# Patient Record
Sex: MALE | Race: WHITE | NOT HISPANIC OR LATINO
[De-identification: names, ages, dates, MRNs, and addresses within clinical notes are randomized per-mention and may not be internally consistent; named-entity substitution may affect disease eponyms.]

---

## 2017-01-16 ENCOUNTER — FORM ENCOUNTER (OUTPATIENT)
Age: 67
End: 2017-01-16

## 2017-01-17 ENCOUNTER — APPOINTMENT (OUTPATIENT)
Dept: CT IMAGING | Facility: IMAGING CENTER | Age: 67
End: 2017-01-17

## 2017-01-17 ENCOUNTER — OUTPATIENT (OUTPATIENT)
Dept: OUTPATIENT SERVICES | Facility: HOSPITAL | Age: 67
LOS: 1 days | End: 2017-01-17
Payer: MEDICARE

## 2017-01-17 DIAGNOSIS — J32.2 CHRONIC ETHMOIDAL SINUSITIS: ICD-10-CM

## 2017-01-17 DIAGNOSIS — J33.9 NASAL POLYP, UNSPECIFIED: ICD-10-CM

## 2017-01-17 PROCEDURE — 70486 CT MAXILLOFACIAL W/O DYE: CPT

## 2017-03-08 ENCOUNTER — FORM ENCOUNTER (OUTPATIENT)
Age: 67
End: 2017-03-08

## 2017-03-09 ENCOUNTER — OUTPATIENT (OUTPATIENT)
Dept: OUTPATIENT SERVICES | Facility: HOSPITAL | Age: 67
LOS: 1 days | End: 2017-03-09
Payer: MEDICARE

## 2017-03-09 VITALS
OXYGEN SATURATION: 97 % | SYSTOLIC BLOOD PRESSURE: 134 MMHG | DIASTOLIC BLOOD PRESSURE: 70 MMHG | RESPIRATION RATE: 16 BRPM | TEMPERATURE: 98 F | HEART RATE: 60 BPM | HEIGHT: 73 IN | WEIGHT: 220.02 LBS

## 2017-03-09 DIAGNOSIS — Z98.890 OTHER SPECIFIED POSTPROCEDURAL STATES: Chronic | ICD-10-CM

## 2017-03-09 DIAGNOSIS — G47.33 OBSTRUCTIVE SLEEP APNEA (ADULT) (PEDIATRIC): ICD-10-CM

## 2017-03-09 DIAGNOSIS — T78.40XS ALLERGY, UNSPECIFIED, SEQUELA: ICD-10-CM

## 2017-03-09 DIAGNOSIS — J32.2 CHRONIC ETHMOIDAL SINUSITIS: ICD-10-CM

## 2017-03-09 DIAGNOSIS — E11.9 TYPE 2 DIABETES MELLITUS WITHOUT COMPLICATIONS: ICD-10-CM

## 2017-03-09 DIAGNOSIS — J33.9 NASAL POLYP, UNSPECIFIED: ICD-10-CM

## 2017-03-09 DIAGNOSIS — Z87.09 PERSONAL HISTORY OF OTHER DISEASES OF THE RESPIRATORY SYSTEM: ICD-10-CM

## 2017-03-09 LAB
BASOPHILS # BLD AUTO: 0.04 K/UL — SIGNIFICANT CHANGE UP (ref 0–0.2)
BASOPHILS NFR BLD AUTO: 0.6 % — SIGNIFICANT CHANGE UP (ref 0–2)
BUN SERPL-MCNC: 21 MG/DL — SIGNIFICANT CHANGE UP (ref 7–23)
CALCIUM SERPL-MCNC: 9.6 MG/DL — SIGNIFICANT CHANGE UP (ref 8.4–10.5)
CHLORIDE SERPL-SCNC: 102 MMOL/L — SIGNIFICANT CHANGE UP (ref 98–107)
CO2 SERPL-SCNC: 27 MMOL/L — SIGNIFICANT CHANGE UP (ref 22–31)
CREAT SERPL-MCNC: 1.03 MG/DL — SIGNIFICANT CHANGE UP (ref 0.5–1.3)
EOSINOPHIL # BLD AUTO: 0.53 K/UL — HIGH (ref 0–0.5)
EOSINOPHIL NFR BLD AUTO: 8.5 % — HIGH (ref 0–6)
GLUCOSE SERPL-MCNC: 140 MG/DL — HIGH (ref 70–99)
HBA1C BLD-MCNC: 7.4 % — HIGH (ref 4–5.6)
HCT VFR BLD CALC: 44.8 % — SIGNIFICANT CHANGE UP (ref 39–50)
HGB BLD-MCNC: 15.1 G/DL — SIGNIFICANT CHANGE UP (ref 13–17)
IMM GRANULOCYTES NFR BLD AUTO: 0 % — SIGNIFICANT CHANGE UP (ref 0–1.5)
LYMPHOCYTES # BLD AUTO: 1.96 K/UL — SIGNIFICANT CHANGE UP (ref 1–3.3)
LYMPHOCYTES # BLD AUTO: 31.3 % — SIGNIFICANT CHANGE UP (ref 13–44)
MCHC RBC-ENTMCNC: 31.3 PG — SIGNIFICANT CHANGE UP (ref 27–34)
MCHC RBC-ENTMCNC: 33.7 % — SIGNIFICANT CHANGE UP (ref 32–36)
MCV RBC AUTO: 92.8 FL — SIGNIFICANT CHANGE UP (ref 80–100)
MONOCYTES # BLD AUTO: 0.57 K/UL — SIGNIFICANT CHANGE UP (ref 0–0.9)
MONOCYTES NFR BLD AUTO: 9.1 % — SIGNIFICANT CHANGE UP (ref 2–14)
NEUTROPHILS # BLD AUTO: 3.17 K/UL — SIGNIFICANT CHANGE UP (ref 1.8–7.4)
NEUTROPHILS NFR BLD AUTO: 50.5 % — SIGNIFICANT CHANGE UP (ref 43–77)
PLATELET # BLD AUTO: 217 K/UL — SIGNIFICANT CHANGE UP (ref 150–400)
PMV BLD: 11 FL — SIGNIFICANT CHANGE UP (ref 7–13)
POTASSIUM SERPL-MCNC: 4 MMOL/L — SIGNIFICANT CHANGE UP (ref 3.5–5.3)
POTASSIUM SERPL-SCNC: 4 MMOL/L — SIGNIFICANT CHANGE UP (ref 3.5–5.3)
RBC # BLD: 4.83 M/UL — SIGNIFICANT CHANGE UP (ref 4.2–5.8)
RBC # FLD: 12.6 % — SIGNIFICANT CHANGE UP (ref 10.3–14.5)
SODIUM SERPL-SCNC: 143 MMOL/L — SIGNIFICANT CHANGE UP (ref 135–145)
WBC # BLD: 6.27 K/UL — SIGNIFICANT CHANGE UP (ref 3.8–10.5)
WBC # FLD AUTO: 6.27 K/UL — SIGNIFICANT CHANGE UP (ref 3.8–10.5)

## 2017-03-09 PROCEDURE — 93010 ELECTROCARDIOGRAM REPORT: CPT

## 2017-03-09 PROCEDURE — 71020: CPT | Mod: 26

## 2017-03-09 RX ORDER — ALBUTEROL 90 UG/1
2 AEROSOL, METERED ORAL
Qty: 0 | Refills: 0 | COMMUNITY

## 2017-03-09 RX ORDER — METFORMIN HYDROCHLORIDE 850 MG/1
1 TABLET ORAL
Qty: 0 | Refills: 0 | COMMUNITY

## 2017-03-09 RX ORDER — OMEGA-3 ACID ETHYL ESTERS 1 G
0 CAPSULE ORAL
Qty: 0 | Refills: 0 | COMMUNITY

## 2017-03-09 RX ORDER — MONTELUKAST 4 MG/1
1 TABLET, CHEWABLE ORAL
Qty: 0 | Refills: 0 | COMMUNITY

## 2017-03-09 NOTE — H&P PST ADULT - LYMPHATIC
posterior cervical L/anterior cervical L/posterior cervical R/anterior cervical R/supraclavicular L/supraclavicular R

## 2017-03-09 NOTE — H&P PST ADULT - RS GEN PE MLT RESP DETAILS PC
no rhonchi/good air movement/respirations non-labored/clear to auscultation bilaterally/no rales/breath sounds equal/no wheezes wheezes/respirations non-labored/no rales/rhonchi

## 2017-03-09 NOTE — H&P PST ADULT - PROBLEM SELECTOR PLAN 2
type 2 , bloods pending from pst , including [id number]. Pt is npo from 11 pm the night before surgery . pt to hold metformin and glipizide day of surgery . Recommend STAT FS upon admit and monitoring FS during hospital stay .

## 2017-03-09 NOTE — H&P PST ADULT - PROBLEM SELECTOR PLAN 3
O2 sat 97% on room air, lungs with expiratory wheezing , rhonchi to right base 1/3 up. Urgent CXR done . Call placed to Dr Ramírez pt to be evaluated today. Medical evaluation requested and to be faxed.

## 2017-03-09 NOTE — H&P PST ADULT - NSANTHOSAYNRD_GEN_A_CORE
No. DARLING screening performed.  STOP BANG Legend: 0-2 = LOW Risk; 3-4 = INTERMEDIATE Risk; 5-8 = HIGH Risk

## 2017-03-09 NOTE — H&P PST ADULT - VISION (WITH CORRECTIVE LENSES IF THE PATIENT USUALLY WEARS THEM):
Normal vision: sees adequately in most situations; can see medication labels, newsprint/Corrective glasses

## 2017-03-09 NOTE — H&P PST ADULT - HISTORY OF PRESENT ILLNESS
This is a 66 y.o. male s/p sinus endoscopy 2011. Pt recently evaluated by Dr Ruvalcaba approximately 6 weeks ago . Pt examined , follow-up Ct of sinuses . Pt complains of nasal congestion. Pt has chronic ethmoidal sinusitis , nasal polyp, unspecified . Pt now for surgery .

## 2017-03-09 NOTE — H&P PST ADULT - MUSCULOSKELETAL
details… detailed exam no joint swelling/no joint warmth/no joint erythema/no calf tenderness/ROM intact

## 2017-03-10 RX ORDER — BUDESONIDE, MICRONIZED 100 %
0 POWDER (GRAM) MISCELLANEOUS
Qty: 0 | Refills: 0 | COMMUNITY

## 2017-03-10 RX ORDER — BUDESONIDE, MICRONIZED 100 %
2 POWDER (GRAM) MISCELLANEOUS
Qty: 0 | Refills: 0 | COMMUNITY

## 2017-03-10 RX ORDER — BUDESONIDE 32 UG/1
0 SPRAY, METERED NASAL
Qty: 0 | Refills: 0 | COMMUNITY

## 2017-03-15 ENCOUNTER — RESULT REVIEW (OUTPATIENT)
Age: 67
End: 2017-03-15

## 2017-03-16 ENCOUNTER — OUTPATIENT (OUTPATIENT)
Dept: OUTPATIENT SERVICES | Facility: HOSPITAL | Age: 67
LOS: 1 days | Discharge: HOME | End: 2017-03-16
Payer: MEDICARE

## 2017-03-16 ENCOUNTER — APPOINTMENT (OUTPATIENT)
Dept: OTOLARYNGOLOGY | Facility: AMBULATORY SURGERY CENTER | Age: 67
End: 2017-03-16

## 2017-03-16 VITALS
SYSTOLIC BLOOD PRESSURE: 116 MMHG | HEIGHT: 73 IN | TEMPERATURE: 97 F | RESPIRATION RATE: 18 BRPM | OXYGEN SATURATION: 98 % | WEIGHT: 220.02 LBS | DIASTOLIC BLOOD PRESSURE: 72 MMHG | HEART RATE: 66 BPM

## 2017-03-16 VITALS
RESPIRATION RATE: 15 BRPM | DIASTOLIC BLOOD PRESSURE: 67 MMHG | OXYGEN SATURATION: 96 % | HEART RATE: 64 BPM | TEMPERATURE: 98 F | SYSTOLIC BLOOD PRESSURE: 115 MMHG

## 2017-03-16 DIAGNOSIS — J33.9 NASAL POLYP, UNSPECIFIED: ICD-10-CM

## 2017-03-16 DIAGNOSIS — Z98.890 OTHER SPECIFIED POSTPROCEDURAL STATES: Chronic | ICD-10-CM

## 2017-03-16 PROCEDURE — 61782 SCAN PROC CRANIAL EXTRA: CPT

## 2017-03-16 PROCEDURE — 31287 NASAL/SINUS ENDOSCOPY SURG: CPT | Mod: 50

## 2017-03-16 PROCEDURE — 31267 ENDOSCOPY MAXILLARY SINUS: CPT | Mod: 50

## 2017-03-16 PROCEDURE — 31276 NSL/SINS NDSC FRNT TISS RMVL: CPT | Mod: LT

## 2017-03-16 PROCEDURE — 88304 TISSUE EXAM BY PATHOLOGIST: CPT | Mod: 26

## 2017-03-16 PROCEDURE — 88305 TISSUE EXAM BY PATHOLOGIST: CPT | Mod: 26

## 2017-03-16 PROCEDURE — 88311 DECALCIFY TISSUE: CPT | Mod: 26

## 2017-03-16 PROCEDURE — 31237 NSL/SINS NDSC SURG BX POLYPC: CPT | Mod: 50,59

## 2017-03-16 PROCEDURE — 31255 NSL/SINS NDSC W/TOT ETHMDCT: CPT | Mod: 50

## 2017-03-16 RX ORDER — OXYCODONE HYDROCHLORIDE 5 MG/1
1 TABLET ORAL
Qty: 50 | Refills: 0 | OUTPATIENT
Start: 2017-03-16

## 2017-03-16 RX ORDER — CEPHALEXIN 500 MG
1 CAPSULE ORAL
Qty: 20 | Refills: 0 | OUTPATIENT
Start: 2017-03-16 | End: 2017-03-26

## 2017-03-16 NOTE — ASU DISCHARGE PLAN (ADULT/PEDIATRIC). - INSTRUCTIONS
SEE Preprinted Instructions from MD Soft cool foods and fluids..nothing hot in  temperatire or spicy--see sheet.

## 2017-03-16 NOTE — ASU DISCHARGE PLAN (ADULT/PEDIATRIC). - DIET
progress slowly,avoid any foods that are spicy, greasy or fatty, increase fluids and resume regular diet in am progress slowly,avoid any foods that are spicy, greasy or fatty, increase fluids and resume regular diet in am/increase fluids

## 2017-03-16 NOTE — ASU DISCHARGE PLAN (ADULT/PEDIATRIC). - NOTIFY
Inability to Tolerate Liquids or Foods/Persistent Nausea and Vomiting/Bleeding that does not stop/Fever greater than 101/Pain not relieved by Medications Persistent Nausea and Vomiting/Inability to Tolerate Liquids or Foods/Fever greater than 101/Pain not relieved by Medications/Bleeding that does not stop/Unable to Urinate

## 2017-03-16 NOTE — ASU DISCHARGE PLAN (ADULT/PEDIATRIC). - SPECIAL INSTRUCTIONS
Please read enclosed teaching sheet. Keep head of bed elevated. Start saline rinse once packing removed tomorrow

## 2017-03-16 NOTE — ASU DISCHARGE PLAN (ADULT/PEDIATRIC). - ACTIVITY LEVEL
no sports/gym/no heavy lifting/no exercise no sports/gym/no heavy lifting/No heavy lifting >10 pounds for 10 days/no exercise

## 2017-03-17 ENCOUNTER — APPOINTMENT (OUTPATIENT)
Dept: OTOLARYNGOLOGY | Facility: CLINIC | Age: 67
End: 2017-03-17

## 2017-03-17 ENCOUNTER — TRANSCRIPTION ENCOUNTER (OUTPATIENT)
Age: 67
End: 2017-03-17

## 2017-03-24 LAB — SURGICAL PATHOLOGY STUDY: SIGNIFICANT CHANGE UP

## 2017-04-14 ENCOUNTER — APPOINTMENT (OUTPATIENT)
Dept: OTOLARYNGOLOGY | Facility: CLINIC | Age: 67
End: 2017-04-14

## 2017-04-14 VITALS
SYSTOLIC BLOOD PRESSURE: 128 MMHG | BODY MASS INDEX: 27.59 KG/M2 | HEIGHT: 74 IN | WEIGHT: 215 LBS | DIASTOLIC BLOOD PRESSURE: 84 MMHG

## 2019-05-03 ENCOUNTER — APPOINTMENT (OUTPATIENT)
Dept: OTOLARYNGOLOGY | Facility: CLINIC | Age: 69
End: 2019-05-03
Payer: MEDICARE

## 2019-05-03 VITALS
HEIGHT: 74 IN | BODY MASS INDEX: 27.59 KG/M2 | DIASTOLIC BLOOD PRESSURE: 79 MMHG | WEIGHT: 215 LBS | SYSTOLIC BLOOD PRESSURE: 127 MMHG | HEART RATE: 63 BPM

## 2019-05-03 PROCEDURE — 99214 OFFICE O/P EST MOD 30 MIN: CPT | Mod: 25

## 2019-05-03 PROCEDURE — 92557 COMPREHENSIVE HEARING TEST: CPT

## 2019-05-03 PROCEDURE — 31231 NASAL ENDOSCOPY DX: CPT

## 2019-05-03 PROCEDURE — 92567 TYMPANOMETRY: CPT

## 2019-05-03 NOTE — REASON FOR VISIT
[Subsequent Evaluation] : a subsequent evaluation for [Spouse] : spouse [FreeTextEntry2] : follow up s/p Bilateral intraoperative CTguided endoscopic nasal polypectomy; ethmoidectomy; sphenoidectomy; maxillary antrostomy with sinus curettage; left frontal sinus exploration 3/16/17

## 2019-05-03 NOTE — PROCEDURE
[Image(s) Captured] : image(s) captured and filed [Oxymetazoline HCl] : oxymetazoline HCl [Topical Lidocaine] : topical lidocaine [Serial Number: ___] : Serial Number: [unfilled] [Flexible Endoscope] : examined with the flexible endoscope [Recalcitrant Symptoms] : recalcitrant symptoms  [Anatomical Abnormality] : anatomical abnormality [Anterior rhinoscopy insufficient to account for symptoms] : anterior rhinoscopy insufficient to account for symptoms [Normal] : the middle meatus had no abnormalities [FreeTextEntry6] : Pre-op indication(s): anosmia\par Post-op indication(s): polyps\par Verbal consent obtained from patient.\par “Anterior rhinoscopy insufficient to account for symptoms” \par Details for procedure: \par Scope #: [id number]\par Type of scope:    flexible fiber optic telescope   X  Rigid glass telescope \par Anesthesia and/or vasoconstriction was achieved topically by using: \par 4% Lidocaine spray   0.05% Oxymetazoline     Other ______ \par The following anatomic sites were directly examined in a sequential fashion: \par The scope was introduced in the nasal passage between the middle and inferior turbinates to exam the inferior portion of the middle meatus and the fontanelle, as well as the maxillary ostia. Next, the scope was passed medially and posteriorly to the middle turbinates to examine the sphenoethmoid recess and the superior turbinate region. \par Upon visualization the finders are as follows: \par Nasal Septum:   Normal   \par Bleeding site cauterized:    Anterior   left   right   Posterior   left   right \par Method:   Silver Nitrate   YAG Laser    Electrocautery ______ \par Right Side: \par * Mucosa: Normal\par * Mucous: Normal\par * Polyp: forming in superior cavity\par * Inferior Turbinate: Normal\par * Middle Turbinate: Normal\par * Superior Turbinate: Normal\par * Inferior Meatus: Normal\par * Middle Meatus: Normal\par * Super Meatus: Normal\par * Sphenoethmoidal Recess: Normal\par Left Side: \par * Mucosa: Normal\par * Mucous: Normal\par * Polyp: Normal\par * Inferior Turbinate: Normal\par * Middle Turbinate: Normal\par * Superior Turbinate: Normal\par * Inferior Meatus: Normal\par * Middle Meatus: Normal\par * Super Meatus: Normal\par * Sphenoethmoidal Recess: Normal\par The patient tolerated the procedure well without any complications.\par \par \par  [de-identified] : anosmia

## 2019-05-03 NOTE — PHYSICAL EXAM
[Nasal Endoscopy Performed] : nasal endoscopy was performed, see procedure section for findings [Normal] : mucosa is normal [Midline] : trachea located in midline position [de-identified] : polyps forming in superior right cavity

## 2019-05-03 NOTE — HISTORY OF PRESENT ILLNESS
[None] : The patient is currently asymptomatic. [de-identified] : 68 year old male follow up s/p Bilateral intraoperative CTguided endoscopic nasal polypectomy; ethmoidectomy; sphenoidectomy; maxillary antrostomy with sinus curettage; left frontal sinus exploration 3/16/17.  States some nasal congestion.  States breathing well through nose.   States still no sense of smell, no change for years.  States  sinus pain/pressure, post nasal drip, nasal discharge.  Denies sinus infections in the past year.  \par  [de-identified] : anosmia

## 2019-05-21 ENCOUNTER — TRANSCRIPTION ENCOUNTER (OUTPATIENT)
Age: 69
End: 2019-05-21

## 2020-03-27 RX ORDER — BUDESONIDE 32 UG/1
32 SPRAY, METERED NASAL DAILY
Qty: 1 | Refills: 5 | Status: ACTIVE | COMMUNITY
Start: 2019-05-03 | End: 1900-01-01

## 2021-03-17 ENCOUNTER — APPOINTMENT (OUTPATIENT)
Dept: OTOLARYNGOLOGY | Facility: CLINIC | Age: 71
End: 2021-03-17
Payer: MEDICARE

## 2021-03-17 VITALS
BODY MASS INDEX: 25.67 KG/M2 | WEIGHT: 200 LBS | DIASTOLIC BLOOD PRESSURE: 74 MMHG | SYSTOLIC BLOOD PRESSURE: 121 MMHG | HEART RATE: 73 BPM | HEIGHT: 74 IN

## 2021-03-17 DIAGNOSIS — H91.93 UNSPECIFIED HEARING LOSS, BILATERAL: ICD-10-CM

## 2021-03-17 DIAGNOSIS — Z80.0 FAMILY HISTORY OF MALIGNANT NEOPLASM OF DIGESTIVE ORGANS: ICD-10-CM

## 2021-03-17 DIAGNOSIS — Z86.39 PERSONAL HISTORY OF OTHER ENDOCRINE, NUTRITIONAL AND METABOLIC DISEASE: ICD-10-CM

## 2021-03-17 DIAGNOSIS — K57.30 DIVERTICULOSIS OF LARGE INTESTINE W/OUT PERFORATION OR ABSCESS W/OUT BLEEDING: ICD-10-CM

## 2021-03-17 DIAGNOSIS — Z87.09 PERSONAL HISTORY OF OTHER DISEASES OF THE RESPIRATORY SYSTEM: ICD-10-CM

## 2021-03-17 DIAGNOSIS — Z83.3 FAMILY HISTORY OF DIABETES MELLITUS: ICD-10-CM

## 2021-03-17 PROCEDURE — 31231 NASAL ENDOSCOPY DX: CPT

## 2021-03-17 PROCEDURE — 99214 OFFICE O/P EST MOD 30 MIN: CPT | Mod: 25

## 2021-03-17 RX ORDER — ALBUTEROL SULFATE 90 UG/1
INHALANT RESPIRATORY (INHALATION)
Refills: 0 | Status: ACTIVE | COMMUNITY

## 2021-03-17 RX ORDER — PREDNISONE 20 MG/1
20 TABLET ORAL DAILY
Qty: 30 | Refills: 1 | Status: DISCONTINUED | COMMUNITY
Start: 2019-05-03 | End: 2021-03-17

## 2021-03-17 NOTE — REASON FOR VISIT
[Subsequent Evaluation] : a subsequent evaluation for [Spouse] : spouse [FreeTextEntry2] : follow up for chronic anosmia

## 2021-03-17 NOTE — PHYSICAL EXAM
[Nasal Endoscopy Performed] : nasal endoscopy was performed, see procedure section for findings [Midline] : trachea located in midline position [Normal] : no abnormal secretions [FreeTextEntry1] : anosmia

## 2021-03-17 NOTE — PROCEDURE
[Image(s) Captured] : image(s) captured and filed [Video Captured] : video captured and filed [Topical Lidocaine] : topical lidocaine [Oxymetazoline HCl] : oxymetazoline HCl [Flexible Endoscope] : examined with the flexible endoscope [Serial Number: ___] : Serial Number: [unfilled] [Recalcitrant Symptoms] : recalcitrant symptoms  [Anterior rhinoscopy insufficient to account for symptoms] : anterior rhinoscopy insufficient to account for symptoms [Allergic] : allergic signs [Normal] : the middle meatus had no abnormalities [Paranasal Sinuses Middle Meatus] : no polyps [FreeTextEntry6] : Pre-op indication(s): anosmia\par Post-op indication(s):airway patent \par Verbal consent obtained from patient.\par “Anterior rhinoscopy insufficient to account for symptoms” \par Details for procedure: \par Scope #: 197\par Type of scope:    flexible fiber optic telescope  X   Rigid glass telescope \par Anesthesia and/or vasoconstriction was achieved topically by using: \par 4% Lidocaine spray   0.05% Oxymetazoline     Other ______ \par The following anatomic sites were directly examined in a sequential fashion: \par The scope was introduced in the nasal passage between the middle and inferior turbinates to exam the inferior portion of the middle meatus and the fontanelle, as well as the maxillary ostia. Next, the scope was passed medially and posteriorly to the middle turbinates to examine the sphenoethmoid recess and the superior turbinate region. \par Upon visualization the finders are as follows: \par Nasal Septum:   Normal    Deviated \par Bleeding site cauterized:    Anterior   left   right   Posterior   left   right \par Method:   Silver Nitrate   YAG Laser    Electrocautery ______ \par Right Side: \par * Mucosa: Normal\par * Mucous: Normal\par * Polyp: Normal\par * Inferior Turbinate: Normal\par * Middle Turbinate: Normal\par * Superior Turbinate: Normal\par * Inferior Meatus: Normal\par * Middle Meatus: Normal\par * Super Meatus: Normal\par * Sphenoethmoidal Recess: Normal\par Left Side: \par * Mucosa: Normal\par * Mucous: Normal\par * Polyp: Normal\par * Inferior Turbinate: Normal\par * Middle Turbinate: Normal\par * Superior Turbinate: Normal\par * Inferior Meatus: Normal\par * Middle Meatus: Normal\par * Super Meatus: Normal\par * Sphenoethmoidal Recess: Normal\par The patient tolerated the procedure well without any complications.\par \par \par  [de-identified] : Anosmia

## 2021-03-17 NOTE — HISTORY OF PRESENT ILLNESS
[de-identified] : 70 year old male follow up for chronic anosmia, history of nasal polyps and SNHL, s/p Bilateral intraoperative CT-guided endoscopic nasal polypectomy; ethmoidectomy; sphenoidectomy; maxillary antrostomy with sinus curettage; left frontal sinus exploration 3/16/17, hearing aid clearance given, prescribed Budesonide nasal spray and Prednisone taper.  States breathing is unchanged, no issues, continues to have anosmia.  Reports intermittent nasal congestion and coughing secondary to nasal polyps triggering asthma, using Albuterol inhaler as needed.  Denies dyspnea, anterior rhinorrhea, post nasal drip, epistaxis, sinus pain/pressure, recent fevers and sinus infections.  Using Budesonide spray with some relief, completed steroid taper with good relief, sense of smell has not returned.

## 2021-05-05 ENCOUNTER — APPOINTMENT (OUTPATIENT)
Dept: PEDIATRIC ALLERGY IMMUNOLOGY | Facility: CLINIC | Age: 71
End: 2021-05-05
Payer: MEDICARE

## 2021-05-05 ENCOUNTER — LABORATORY RESULT (OUTPATIENT)
Age: 71
End: 2021-05-05

## 2021-05-05 ENCOUNTER — NON-APPOINTMENT (OUTPATIENT)
Age: 71
End: 2021-05-05

## 2021-05-05 VITALS
HEIGHT: 74 IN | OXYGEN SATURATION: 96 % | BODY MASS INDEX: 26.31 KG/M2 | HEART RATE: 62 BPM | WEIGHT: 205 LBS | TEMPERATURE: 96.3 F | SYSTOLIC BLOOD PRESSURE: 114 MMHG | DIASTOLIC BLOOD PRESSURE: 69 MMHG

## 2021-05-05 PROCEDURE — 95012 NITRIC OXIDE EXP GAS DETER: CPT

## 2021-05-05 PROCEDURE — 94375 RESPIRATORY FLOW VOLUME LOOP: CPT

## 2021-05-05 PROCEDURE — 99205 OFFICE O/P NEW HI 60 MIN: CPT | Mod: 25

## 2021-05-05 PROCEDURE — 95004 PERQ TESTS W/ALRGNC XTRCS: CPT

## 2021-05-05 PROCEDURE — 94664 DEMO&/EVAL PT USE INHALER: CPT | Mod: 59

## 2021-05-05 PROCEDURE — 36415 COLL VENOUS BLD VENIPUNCTURE: CPT

## 2021-05-06 ENCOUNTER — NON-APPOINTMENT (OUTPATIENT)
Age: 71
End: 2021-05-06

## 2021-05-06 RX ORDER — MONTELUKAST SODIUM 10 MG/1
TABLET, FILM COATED ORAL
Refills: 0 | Status: ACTIVE | COMMUNITY

## 2021-05-06 RX ORDER — BUDESONIDE 0.5 MG/2ML
0.5 INHALANT ORAL TWICE DAILY
Qty: 60 | Refills: 4 | Status: ACTIVE | COMMUNITY
Start: 2021-03-17

## 2021-05-06 NOTE — SOCIAL HISTORY
[House] : [unfilled] lives in a house  [Radiator/Baseboard] : heating provided by radiator(s)/baseboard(s) [None] : none [Dust Mite Covers] : does not have dust mite covers [Bedroom] : not in the bedroom

## 2021-05-06 NOTE — REVIEW OF SYSTEMS
[Fatigue] : no fatigue [Eye Discharge] : no eye discharge [Puffy Eyelids] : no puffy ~T eyelids [Bloodshot Eyes] : no bloodshot ~T eyes [Rhinorrhea] : no rhinorrhea [Snoring] : no snoring [Post Nasal Drip] : no post nasal drip [Sneezing] : no sneezing [Cyanosis] : no cyanosis [Edema] : no edema [Exercise Intolerance] : no persistence of exercise intolerance [Vomiting] : no vomiting [Nl] : no history of recurrent infections of the sinuses,ear, throat, lungs (pneumonia/ bronchitis) or skin [FreeTextEntry6] : see HPI

## 2021-05-06 NOTE — CONSULT LETTER
[Dear  ___] : Dear  [unfilled], [Consult Letter:] : I had the pleasure of evaluating your patient, [unfilled]. [Please see my note below.] : Please see my note below. [Consult Closing:] : Thank you very much for allowing me to participate in the care of this patient.  If you have any questions, please do not hesitate to contact me. [Sincerely,] : Sincerely, [FreeTextEntry3] : Diamond Barajas MD FAAELLEN, PURNIMA\par Adult and Pediatric Allergy, Asthma and Clinical Immunology\par  of Medicine and Pediatrics at\par   [facility]\par Section Head, Adult Allergy and Immunology\par   [facility]\par   Division of Allergy, Asthma and Immunology\par   [address] [place] [address], [place] [address]\par   [place] [address]\par   Phone [phone]  Fax [phone]\par \par   Breath sounds clear and equal bilaterally. No chest wall ttp B

## 2021-05-06 NOTE — PHYSICAL EXAM
[Alert] : alert [Well Nourished] : well nourished [No Acute Distress] : no acute distress [Well Developed] : well developed [Sclera Not Icteric] : sclera not icteric [Normal TMs] : both tympanic membranes were normal [Normal Rate and Effort] : normal respiratory rhythm and effort [No Crackles] : no crackles [Normal Rate] : heart rate was normal  [Normal S1, S2] : normal S1 and S2 [No murmur] : no murmur [Regular Rhythm] : with a regular rhythm [Skin Intact] : skin intact  [No Rash] : no rash [Normal Mood] : mood was normal [Normal Affect] : affect was normal [Judgment and Insight Age Appropriate] : judgement and insight is age appropriate [Alert, Awake, Oriented as Age-Appropriate] : alert, awake, oriented as age appropriate [Healthy Appearance] : healthy appearance [Normal Voice/Communication] : normal voice communication [Conjunctival Erythema] : no conjunctival erythema [Boggy Nasal Turbinates] : no boggy and/or pale nasal turbinates [Pharyngeal erythema] : no pharyngeal erythema [Posterior Pharyngeal Cobblestoning] : no posterior pharyngeal cobblestoning [Clear Rhinorrhea] : no clear rhinorrhea was seen [Exudate] : no exudate [Soft] : abdomen soft [Not Tender] : non-tender [Normal Cervical Lymph Nodes] : cervical [Patches] : no patches [No clubbing] : no clubbing [No Cyanosis] : no cyanosis [de-identified] : +inspiratory wheeze, resolved with cough.

## 2021-05-06 NOTE — HISTORY OF PRESENT ILLNESS
[Eczematous rashes] : eczematous rashes [Venom Reactions] : venom reactions [Food Allergies] : food allergies [> or = 20] : > than or = 20 [de-identified] : 70 year old male follow with Type 2 diabetes,  chronic anosmia, history of nasal polyps with two polypectomy, asthma  and SNHL. \par \par CHRONIC RHINOSINUSITIS: \par He was diagnoses with nasal polyps in 2008. His  first sinus surgery with polypectomy was on 2013, with improvement of nasal congestion. Approximately, two years later he redeveloped nasal congestion which led to second sinus surgery with polypectomy on March 2017, subsequently he was placed on budesonide rinse.  Currently denies nasal congestion. However admits to anosmia. Admits to  intermittent sense of taste. Recent rhinoscopy did not reveal nasal polyps. \par \par SNOT (22)-22\par \par 2014, he was diagnosed with Asthma. He is currently on Montelukast.  For past few months, he has had gurgling in his chest and morning cough, which self resolves. Denies any chest tightness, shortness of breath  or exertional wheezing. Last use of albuterol was yesterday, secondary to dry cough, which resolved with albuterol.  ACT 22. \par Oral steroid use: No recent use of oral steroids. Last use was few years ago, due to nasal polyps, with improvement of nasal congestion. \par \par \par Eight years ago, he developed  chest tightness with Aspirin and Motrin. Hence he avoids NSAIDS. \par \par Denies ocular pruritus or  rhinorrhea with change of season. Took Claritin two days ago, for sneezing. \par \par No increase nasal congestion with alcohol.\par \par No history of urticaria. \par \par Last year, he was admitted to the general floor for  COVID 19 infection. He was treated in hospital with hydroxychloroquine, Zithromax and oxygen. He was discharged within four days in stable condition. \par \par - Patient took COVID 19 vaccine- FEB/2012 and March/2021\par \par  [FreeTextEntry7] : 22

## 2021-05-06 NOTE — END OF VISIT
[FreeTextEntry3] : Larrygeorges Ham acted as a scribe. All medical record entries were made under my, Dr Diamond Barajas's direction. I have reviewed the chart and agree that the record accurately reflects my personal opinion of history, physical exam, assessment and plan. I have also personally directed, reviewed, and agree with discharge instructions.

## 2021-05-06 NOTE — IMPRESSION
[Spirometry] : Spirometry [Allergy Testing Dust Mite] : dust mites [Allergy Testing Mixed Feathers] : feathers [] : molds [Allergy Testing Trees] : trees [Allergy Testing Cockroach] : cockroach [Allergy Testing Dog] : dog [Allergy Testing Cat] : cat [Allergy Testing Weeds] : weeds [Allergy Testing Grasses] : grasses [Mild] : (mild) [Reversible] :  with reversibility. [________] : [unfilled] [FreeTextEntry2] : FENO 67

## 2021-05-18 ENCOUNTER — NON-APPOINTMENT (OUTPATIENT)
Age: 71
End: 2021-05-18

## 2021-06-02 ENCOUNTER — APPOINTMENT (OUTPATIENT)
Dept: PEDIATRIC ALLERGY IMMUNOLOGY | Facility: CLINIC | Age: 71
End: 2021-06-02
Payer: MEDICARE

## 2021-06-02 ENCOUNTER — NON-APPOINTMENT (OUTPATIENT)
Age: 71
End: 2021-06-02

## 2021-06-02 VITALS — BODY MASS INDEX: 27.26 KG/M2 | WEIGHT: 212.39 LBS | HEART RATE: 76 BPM | HEIGHT: 74 IN

## 2021-06-02 PROCEDURE — 99214 OFFICE O/P EST MOD 30 MIN: CPT | Mod: 25

## 2021-06-02 PROCEDURE — 94010 BREATHING CAPACITY TEST: CPT

## 2021-06-03 DIAGNOSIS — R76.8 OTHER SPECIFIED ABNORMAL IMMUNOLOGICAL FINDINGS IN SERUM: ICD-10-CM

## 2021-06-03 LAB
ALBUMIN SERPL ELPH-MCNC: 4.6 G/DL
ALP BLD-CCNC: 61 U/L
ALT SERPL-CCNC: 20 U/L
ANION GAP SERPL CALC-SCNC: 12 MMOL/L
AST SERPL-CCNC: 21 U/L
BASOPHILS # BLD AUTO: 0.05 K/UL
BASOPHILS NFR BLD AUTO: 0.7 %
BILIRUB SERPL-MCNC: 0.3 MG/DL
BUN SERPL-MCNC: 18 MG/DL
CALCIUM SERPL-MCNC: 9.8 MG/DL
CH50 SERPL-MCNC: 60 U/ML
CHLORIDE SERPL-SCNC: 105 MMOL/L
CO2 SERPL-SCNC: 24 MMOL/L
CREAT SERPL-MCNC: 0.93 MG/DL
DEPRECATED KAPPA LC FREE/LAMBDA SER: 1.54 RATIO
EOSINOPHIL # BLD AUTO: 0.19 K/UL
EOSINOPHIL NFR BLD AUTO: 2.5 %
GLUCOSE SERPL-MCNC: 129 MG/DL
HCT VFR BLD CALC: 44.5 %
HGB BLD-MCNC: 14 G/DL
IGA SER QL IEP: 368 MG/DL
IGG SER QL IEP: 1269 MG/DL
IGM SER QL IEP: 27 MG/DL
IMM GRANULOCYTES NFR BLD AUTO: 0.1 %
KAPPA LC CSF-MCNC: 1.29 MG/DL
KAPPA LC SERPL-MCNC: 1.99 MG/DL
LYMPHOCYTES # BLD AUTO: 1.77 K/UL
LYMPHOCYTES NFR BLD AUTO: 23.3 %
MAN DIFF?: NORMAL
MCHC RBC-ENTMCNC: 29.5 PG
MCHC RBC-ENTMCNC: 31.5 GM/DL
MCV RBC AUTO: 93.7 FL
MONOCYTES # BLD AUTO: 0.65 K/UL
MONOCYTES NFR BLD AUTO: 8.6 %
NEUTROPHILS # BLD AUTO: 4.93 K/UL
NEUTROPHILS NFR BLD AUTO: 64.8 %
PLATELET # BLD AUTO: 253 K/UL
POTASSIUM SERPL-SCNC: 4.7 MMOL/L
PROT SERPL-MCNC: 7.5 G/DL
RBC # BLD: 4.75 M/UL
RBC # FLD: 13.2 %
SODIUM SERPL-SCNC: 141 MMOL/L
WBC # FLD AUTO: 7.6 K/UL

## 2021-06-07 LAB
TOTAL IGE SMQN RAST: 170 KU/L
TRYPTASE: 7 UG/L

## 2021-06-07 NOTE — HISTORY OF PRESENT ILLNESS
[de-identified] : 71  year old male follow with Type 2 diabetes, chronic anosmia, Aspirin Exacerbated Respiratory Disease  AERD with two FESS with polypectomy,  asthma and SNHL here for the first dose of dupilumab. Due to his age Aspirin desensitization was deferred. \par \par Interval history: \par Reportedly, his nasal polyps are well controlled with budesonide, however still  has anosmia, thus dupilumab was started today. In terms of asthma, last visit c/o of intermittent cough, he was on montelukast. Last visit Flovent was added, his cough resolved with  initiation of Flovent.  Denies any wheezing, shortness of breath or exertional symptoms. ACT 22. \par \par CHRONIC RHINOSINUSITIS: \par He was diagnoses with nasal polyps in 2008. His first sinus surgery with polypectomy was on 2013, with improvement of nasal congestion. Approximately, two years later he redeveloped nasal congestion which led to second sinus surgery with polypectomy on March 2017, subsequently he was placed on budesonide rinse. Currently denies nasal congestion. However admits to anosmia. Admits to intermittent sense of taste. Recent rhinoscopy did not reveal nasal polyps. \par \par SNOT (22)-11\par \par Past History: \par 2014, he was diagnosed with Asthma. He is currently on Montelukast. For past few months, he has had gurgling in his chest and morning cough, which self resolves. Denies any chest tightness, shortness of breath or exertional wheezing. Last use of albuterol was yesterday, secondary to dry cough, which resolved with albuterol. ACT 22. \par Oral steroid use: No recent use of oral steroids. Last use was few years ago, due to nasal polyps, with improvement of nasal congestion. \par \par \par Eight years ago, he developed chest tightness with Aspirin and Motrin. Hence he avoids NSAIDS. \par \par Denies ocular pruritus or rhinorrhea with change of season. Took Claritin two days ago, for sneezing. \par \par No increase nasal congestion with alcohol.\par \par No history of urticaria. \par \par Last year, he was admitted to the general floor for COVID 19 infection. He was treated in hospital with hydroxychloroquine, Zithromax and oxygen. He was discharged within four days in stable condition. \par \par - Patient took COVID 19 vaccine- FEB/2012 and March/2021\par \par \par No history or symptoms of eczematous rashes, venom reactions, food allergies. \par \par ACT Questionnaire Group: > than or = 20 \par ACT Questionnaire Score: 22 \par \par \par citirazine and budenosnide. \par

## 2021-06-07 NOTE — PHYSICAL EXAM
[Alert] : alert [Well Nourished] : well nourished [No Acute Distress] : no acute distress [Well Developed] : well developed [Normal TMs] : both tympanic membranes were normal [Normal Nasal Mucosa] : the nasal mucosa was normal [Normal Tonsils] : normal tonsils [No Neck Mass] : no neck mass was observed [Normal Rate and Effort] : normal respiratory rhythm and effort [No Crackles] : no crackles [Bilateral Audible Breath Sounds] : bilateral audible breath sounds [Normal Rate] : heart rate was normal  [Normal S1, S2] : normal S1 and S2 [No murmur] : no murmur [Regular Rhythm] : with a regular rhythm [Skin Intact] : skin intact  [No Rash] : no rash [Boggy Nasal Turbinates] : no boggy and/or pale nasal turbinates [Pharyngeal erythema] : no pharyngeal erythema [Posterior Pharyngeal Cobblestoning] : no posterior pharyngeal cobblestoning [Clear Rhinorrhea] : no clear rhinorrhea was seen [Exudate] : no exudate [Wheezing] : no wheezing was heard [Patches] : no patches

## 2021-06-07 NOTE — REVIEW OF SYSTEMS
[Nl] : Integumentary [Fatigue] : no fatigue [Eye Discharge] : no eye discharge [Puffy Eyelids] : no puffy ~T eyelids [Redness Of Eyelid] : no redness of ~T eyelid [Cyanosis] : no cyanosis [Exercise Intolerance] : no persistence of exercise intolerance [Palpitations] : no palpitations [SOB at Rest] : no shortness of breath at rest [Nocturnal Awakening] : no nocturnal awakening with shortness of breath [Cough] : no cough [Wheezing Worsens With Exercise] : wheezing does not worsen with exercise [Wheezing] : no wheezing [FreeTextEntry4] : see HPI

## 2021-06-17 LAB
COMPLEMENT, ALTERNATE PATHWAY (AH50): 107
LEUKOTRIENE E4, URINE: 40 CD:455662145
MANNAN BINDING LECTIN (MBL): 398 NG/ML

## 2021-06-24 ENCOUNTER — RX RENEWAL (OUTPATIENT)
Age: 71
End: 2021-06-24

## 2021-06-28 ENCOUNTER — NON-APPOINTMENT (OUTPATIENT)
Age: 71
End: 2021-06-28

## 2021-08-01 ENCOUNTER — RX RENEWAL (OUTPATIENT)
Age: 71
End: 2021-08-01

## 2021-09-24 ENCOUNTER — APPOINTMENT (OUTPATIENT)
Dept: PEDIATRIC ALLERGY IMMUNOLOGY | Facility: CLINIC | Age: 71
End: 2021-09-24
Payer: MEDICARE

## 2021-09-24 VITALS
HEART RATE: 63 BPM | WEIGHT: 209 LBS | DIASTOLIC BLOOD PRESSURE: 76 MMHG | HEIGHT: 74 IN | SYSTOLIC BLOOD PRESSURE: 122 MMHG | TEMPERATURE: 96.2 F | BODY MASS INDEX: 26.82 KG/M2 | OXYGEN SATURATION: 96 %

## 2021-09-24 PROCEDURE — 99213 OFFICE O/P EST LOW 20 MIN: CPT

## 2021-09-29 ENCOUNTER — RX RENEWAL (OUTPATIENT)
Age: 71
End: 2021-09-29

## 2021-09-29 LAB
ALBUMIN SERPL ELPH-MCNC: 4.7 G/DL
ALP BLD-CCNC: 67 U/L
ALT SERPL-CCNC: 20 U/L
ANION GAP SERPL CALC-SCNC: 11 MMOL/L
AST SERPL-CCNC: 20 U/L
BASOPHILS # BLD AUTO: 0.05 K/UL
BASOPHILS NFR BLD AUTO: 0.7 %
BILIRUB SERPL-MCNC: 0.2 MG/DL
BUN SERPL-MCNC: 21 MG/DL
CALCIUM SERPL-MCNC: 9.6 MG/DL
CHLORIDE SERPL-SCNC: 106 MMOL/L
CO2 SERPL-SCNC: 25 MMOL/L
CREAT SERPL-MCNC: 1.03 MG/DL
DEPRECATED KAPPA LC FREE/LAMBDA SER: 1.16 RATIO
EOSINOPHIL # BLD AUTO: 0.75 K/UL
EOSINOPHIL NFR BLD AUTO: 9.8 %
GLUCOSE SERPL-MCNC: 192 MG/DL
HCT VFR BLD CALC: 43.7 %
HGB BLD-MCNC: 13.8 G/DL
IGA SER QL IEP: 370 MG/DL
IGG SER QL IEP: 1276 MG/DL
IGM SER QL IEP: 22 MG/DL
IMM GRANULOCYTES NFR BLD AUTO: 0.3 %
KAPPA LC CSF-MCNC: 1.55 MG/DL
KAPPA LC SERPL-MCNC: 1.8 MG/DL
LYMPHOCYTES # BLD AUTO: 2.31 K/UL
LYMPHOCYTES NFR BLD AUTO: 30 %
MAN DIFF?: NORMAL
MCHC RBC-ENTMCNC: 30.2 PG
MCHC RBC-ENTMCNC: 31.6 GM/DL
MCV RBC AUTO: 95.6 FL
MONOCYTES # BLD AUTO: 0.72 K/UL
MONOCYTES NFR BLD AUTO: 9.4 %
NEUTROPHILS # BLD AUTO: 3.84 K/UL
NEUTROPHILS NFR BLD AUTO: 49.8 %
PLATELET # BLD AUTO: 278 K/UL
POTASSIUM SERPL-SCNC: 4.7 MMOL/L
PROT SERPL-MCNC: 7.3 G/DL
RBC # BLD: 4.57 M/UL
RBC # FLD: 13.8 %
SODIUM SERPL-SCNC: 141 MMOL/L
TOTAL IGE SMQN RAST: 105 KU/L
WBC # FLD AUTO: 7.69 K/UL

## 2021-09-30 LAB — LEUKOTRIENE E4, URINE: <23 CD:455662145

## 2021-09-30 NOTE — HISTORY OF PRESENT ILLNESS
[de-identified] : 71 year old male follow with Type 2 diabetes, chronic anosmia, Aspirin Exacerbated Respiratory Disease AERD with two FESS with polypectomy, On dupixent (started Jun, 2021) asthma and SNHL here for follow up. Due to his age Aspirin desensitization was deferred. \par March ENT appointment- No polyps. \par \par Interval history: \par Reportedly, his asthma and cough  improved with Dupilumab (completed 8 doses)  however still has anosmia. No ENT evaluation since he started Dupilumab. He is on budesonide rinse and  Flovent.  Denies any wheezing, shortness of breath or exertional symptoms. ACT 24.\par SNOT(22)-17. \par \par CHRONIC RHINOSINUSITIS: \par He was diagnoses with nasal polyps in 2008. His first sinus surgery with polypectomy was on 2013, with improvement of nasal congestion. Approximately, two years later he redeveloped nasal congestion which led to second sinus surgery with polypectomy on March 2017, subsequently he was placed on budesonide rinse. Currently denies nasal congestion. However admits to anosmia. Admits to intermittent sense of taste. Recent rhinoscopy did not reveal nasal polyps. \par \par SNOT (22)-11\par \par Past History: \par 2014, he was diagnosed with Asthma. He is currently on Montelukast. For past few months, he has had gurgling in his chest and morning cough, which self resolves. Denies any chest tightness, shortness of breath or exertional wheezing. Last use of albuterol was yesterday, secondary to dry cough, which resolved with albuterol. ACT 22. \par Oral steroid use: No recent use of oral steroids. Last use was few years ago, due to nasal polyps, with improvement of nasal congestion. \par \par \par Eight years ago, he developed chest tightness with Aspirin and Motrin. Hence he avoids NSAIDS. \par \par Denies ocular pruritus or rhinorrhea with change of season. Took Claritin two days ago, for sneezing. \par \par No increase nasal congestion with alcohol.\par \par No history of urticaria. \par \par Last year, he was admitted to the general floor for COVID 19 infection. He was treated in hospital with hydroxychloroquine, Zithromax and oxygen. He was discharged within four days in stable condition. \par \par - Patient took COVID 19 vaccine- FEB/2012 and March/2021\par \par \par No history or symptoms of eczematous rashes, venom reactions, food allergies. \par \par \par Completed both mRna COVID 19 vaccine in March 2021. \par

## 2021-09-30 NOTE — REVIEW OF SYSTEMS
used [Nl] : Integumentary [Fatigue] : no fatigue [Eye Discharge] : no eye discharge [Eye Redness] : no redness [Puffy Eyelids] : no puffy ~T eyelids [Bloodshot Eyes] : no bloodshot ~T eyes [FreeTextEntry4] : See HPI  [FreeTextEntry6] : see HPI

## 2021-09-30 NOTE — PHYSICAL EXAM
[Alert] : alert [Well Nourished] : well nourished [No Acute Distress] : no acute distress [Well Developed] : well developed [Sclera Not Icteric] : sclera not icteric [Normal TMs] : both tympanic membranes were normal [Normal Tonsils] : normal tonsils [Normal Rate and Effort] : normal respiratory rhythm and effort [No Crackles] : no crackles [Bilateral Audible Breath Sounds] : bilateral audible breath sounds [Normal Rate] : heart rate was normal  [Normal S1, S2] : normal S1 and S2 [No murmur] : no murmur [Regular Rhythm] : with a regular rhythm [No Rash] : no rash [Skin Intact] : skin intact  [No Skin Lesions] : no skin lesions [Conjunctival Erythema] : no conjunctival erythema [Boggy Nasal Turbinates] : no boggy and/or pale nasal turbinates [Pharyngeal erythema] : no pharyngeal erythema [Posterior Pharyngeal Cobblestoning] : no posterior pharyngeal cobblestoning [Clear Rhinorrhea] : no clear rhinorrhea was seen [Exudate] : no exudate [Wheezing] : no wheezing was heard [Patches] : no patches

## 2021-10-07 LAB
DEPRECATED S PNEUM 1 IGG SER-MCNC: 5.2 MCG/ML
DEPRECATED S PNEUM12 AB SER-ACNC: 1 MCG/ML
DEPRECATED S PNEUM14 AB SER-ACNC: 5 MCG/ML
DEPRECATED S PNEUM17 IGG SER IA-MCNC: 15.8 MCG/ML
DEPRECATED S PNEUM18 IGG SER IA-MCNC: 2.8 MCG/ML
DEPRECATED S PNEUM19 IGG SER-MCNC: 3.8 MCG/ML
DEPRECATED S PNEUM19 IGG SER-MCNC: 76.8 MCG/ML
DEPRECATED S PNEUM2 IGG SER-MCNC: 4.7 MCG/ML
DEPRECATED S PNEUM20 IGG SER-MCNC: 1 MCG/ML
DEPRECATED S PNEUM22 IGG SER-MCNC: 3.4 MCG/ML
DEPRECATED S PNEUM23 AB SER-ACNC: 7 MCG/ML
DEPRECATED S PNEUM3 AB SER-ACNC: 2.5 MCG/ML
DEPRECATED S PNEUM34 IGG SER-MCNC: 4.2 MCG/ML
DEPRECATED S PNEUM4 AB SER-ACNC: 0.7 MCG/ML
DEPRECATED S PNEUM5 IGG SER-MCNC: 36.2 MCG/ML
DEPRECATED S PNEUM6 IGG SER-MCNC: 1.4 MCG/ML
DEPRECATED S PNEUM7 IGG SER-ACNC: 15.5 MCG/ML
DEPRECATED S PNEUM8 AB SER-ACNC: 4.2 MCG/ML
DEPRECATED S PNEUM9 AB SER-ACNC: NORMAL MCG/ML
DEPRECATED S PNEUM9 IGG SER-MCNC: 3.8 MCG/ML
STREPTOCOCCUS PNEUMONIAE SEROTYPE 11A: 6.2 MCG/ML
STREPTOCOCCUS PNEUMONIAE SEROTYPE 15B: 13.8 MCG/ML
STREPTOCOCCUS PNEUMONIAE SEROTYPE 33F: 1.7 MCG/ML

## 2021-10-13 ENCOUNTER — RX RENEWAL (OUTPATIENT)
Age: 71
End: 2021-10-13

## 2021-10-13 RX ORDER — DUPILUMAB 300 MG/2ML
300 INJECTION, SOLUTION SUBCUTANEOUS
Qty: 4 | Refills: 12 | Status: ACTIVE | COMMUNITY
Start: 2021-05-05 | End: 1900-01-01

## 2021-10-25 ENCOUNTER — NON-APPOINTMENT (OUTPATIENT)
Age: 71
End: 2021-10-25

## 2021-12-01 ENCOUNTER — APPOINTMENT (OUTPATIENT)
Dept: OTOLARYNGOLOGY | Facility: CLINIC | Age: 71
End: 2021-12-01
Payer: MEDICARE

## 2021-12-01 ENCOUNTER — NON-APPOINTMENT (OUTPATIENT)
Age: 71
End: 2021-12-01

## 2021-12-01 VITALS
TEMPERATURE: 98.2 F | WEIGHT: 209 LBS | SYSTOLIC BLOOD PRESSURE: 114 MMHG | HEART RATE: 59 BPM | BODY MASS INDEX: 26.82 KG/M2 | HEIGHT: 74 IN | DIASTOLIC BLOOD PRESSURE: 71 MMHG

## 2021-12-01 PROCEDURE — 31231 NASAL ENDOSCOPY DX: CPT

## 2021-12-01 PROCEDURE — 99214 OFFICE O/P EST MOD 30 MIN: CPT | Mod: 25

## 2021-12-01 RX ORDER — FLUTICASONE PROPIONATE 93 UG/1
93 SPRAY, METERED NASAL
Qty: 6 | Refills: 3 | Status: ACTIVE | COMMUNITY
Start: 2021-12-01 | End: 1900-01-01

## 2021-12-01 NOTE — ASSESSMENT
[FreeTextEntry1] : Samter's triad s/p 2 FESS, on budesonide irrigations and dupixent, and anosmia:\par - no evidence of polyps on exam however with some mild ethmoid roof edema that may be contributing\par - recommend cont dupixent for a full trial of a year, although doubt will regain smell if no improvement after 7 mo on it\par - recommend use budesonide irrigation properly; explained to patient how to use properly\par - recommend trial of xhance to bring steroid up high into nasal cavity\par - smell training\par - f/u 2 months to re-eval

## 2021-12-01 NOTE — DATA REVIEWED
[de-identified] : CT from 3/2017 reviewed and at that time with significant polyposis and pansinus opacification; no post-op scan noted

## 2021-12-01 NOTE — HISTORY OF PRESENT ILLNESS
[de-identified] : 72yo male with hx of nasal polyps, aspirin allergy, asthma for 30-40 years. Has followed with Dr. Ruvalcaba with 1 sinus surgeries about 20 years ago. He then underwent bilateral intraoperative CT-guided endoscopic nasal polypectomy; ethmoidectomy; sphenoidectomy; maxillary antrostomy with sinus curettage; left frontal sinus exploration 3/16/17. He was started on budesonide which he uses in a rinse once a day. Is not using any other sprays. \par \par His breathing is fine and his asthma is mostly better. He notes his sense of smell is mostly absent other than smelling one or two things in the past, he has normal taste. He was still bothered by his lack of smell and he was referred to Dr. Barajas for dupixent. He has been on Dupixent for 7 months with no improvement of sense of smell. He is interested in what he can do to try and regain his sense of smell.

## 2021-12-01 NOTE — CONSULT LETTER
[Dear  ___] : Dear  [unfilled], [Consult Letter:] : I had the pleasure of evaluating your patient, [unfilled]. [Please see my note below.] : Please see my note below. [Consult Closing:] : Thank you very much for allowing me to participate in the care of this patient.  If you have any questions, please do not hesitate to contact me. [Sincerely,] : Sincerely, [FreeTextEntry3] : Tata Krause MD\par Otolaryngology and Cranial Base Surgery\par Attending Physician - Department of Otolaryngology and Head & Neck Surgery\par [facility]\par \par [employer] [facility]

## 2021-12-01 NOTE — PROCEDURE
[FreeTextEntry6] : Afrin and lidocaine were topically sprayed. Flexible scope #2 was used. Right nasal passage with normal inferior, middle and superior turbinates. Nasal passage patent with clear middle meatus, clear max antrostomy with quiescent mucosa, clear tiny sphenoid os and clear sphenoethmoid recess. Mild ethmoid roof edema. Left nasal passage with normal inferior, middle and superior turbinates. Nasal passage was patent with clear middle meatus and widely patent max antrostomy with quiescent mucosa and clear sphenoethmoid recess. Mild ethmoid roof edema. No mucopurulence or polyps appreciated. Nasopharynx clear.

## 2022-02-02 ENCOUNTER — APPOINTMENT (OUTPATIENT)
Dept: OTOLARYNGOLOGY | Facility: CLINIC | Age: 72
End: 2022-02-02
Payer: MEDICARE

## 2022-02-02 ENCOUNTER — NON-APPOINTMENT (OUTPATIENT)
Age: 72
End: 2022-02-02

## 2022-02-02 VITALS
HEART RATE: 66 BPM | HEIGHT: 74 IN | DIASTOLIC BLOOD PRESSURE: 61 MMHG | SYSTOLIC BLOOD PRESSURE: 113 MMHG | TEMPERATURE: 97.8 F | BODY MASS INDEX: 26.69 KG/M2 | WEIGHT: 208 LBS

## 2022-02-02 PROCEDURE — 31231 NASAL ENDOSCOPY DX: CPT

## 2022-02-02 PROCEDURE — 99214 OFFICE O/P EST MOD 30 MIN: CPT | Mod: 25

## 2022-02-02 NOTE — ASSESSMENT
[FreeTextEntry1] : 70 yo male with hx of nasal polyps; samters triad, s/p 2 Fess 20 years ago, on budesnoide, irrigations, Xhance, dupixent. Under care of Dr Barajas. Sense of smell has not returned. \par \par - He is doing well and hasn’t needed his inhalers in about 6 months, wants to know which medication he still needs\par \par hyposmia and no polyposis:\par - After discussion with patient, decided to have him continue with Xhance and Budesonide; patient agrees will complete the next two Dupixent rounds that he has at home, then discontinue \par - continue smell training daily \par - follow up with Dr Barajas in regards to inhalers and asthma follow up care \par - Follow up here in 3 months ( 2-3 months after finishing dupixent) and if no evidence of recurrent swelling/polyps can d/c xhance or budesonide and cont irrigations BID\par

## 2022-02-02 NOTE — HISTORY OF PRESENT ILLNESS
[de-identified] : Patient with a history of Samters Triad last seen in December 2021 for issues with sense of smell related to history of polyposis. He had FESS with Dr Ruvalcaba 20 years ago. \par At the last visit his polyps had not returned as her Dr Krause. \par Patient has been doing the Xhance and the budesonide since the last visit and reports no change in his sense of smell. \par he keeps trying the smell training with no change. \par He is on Dupixent and inhalers as per Dr Barajas. \par He denies nasal congestion, sinus pressure and issues breathing through his nose \par

## 2022-02-02 NOTE — END OF VISIT
[FreeTextEntry3] : I personally saw and examined . NAINA GLEZ in detail this visit today. I personally reviewed the HPI, PMH, FH, SH, ROS, and medications and allergies. I have spoke to Jaelyn De La Garza PA-C regarding the history and have personally determined the assessment  and plan of care and documented this myself. i was present and participated in all key portions of the encounter and all procedures noted above. I have made changes in the body of the note where appropriate.\par

## 2022-02-02 NOTE — PROCEDURE
[FreeTextEntry6] : Afrin and lidocaine were topically sprayed. Flexible scope #1 was used. Right nasal passage with normal inferior, middle and superior turbinates. Nasal passage pateint with clear middle meatus, clear max antrostomy with clear mucosa, clear sphenoid and clear sphenoethmoid recess. Left nasal passage with normal inferior, middle, and superior turbinates. nasal passage was patent with clear middle meatus and widely patent max antrostomy with quiescent mucosa and clear sphenoethmoid recess. No mupocupurlce or polyps. Nasopharynx clear.

## 2022-02-03 ENCOUNTER — NON-APPOINTMENT (OUTPATIENT)
Age: 72
End: 2022-02-03

## 2022-03-04 ENCOUNTER — NON-APPOINTMENT (OUTPATIENT)
Age: 72
End: 2022-03-04

## 2022-03-04 ENCOUNTER — APPOINTMENT (OUTPATIENT)
Dept: PEDIATRIC ALLERGY IMMUNOLOGY | Facility: CLINIC | Age: 72
End: 2022-03-04
Payer: MEDICARE

## 2022-03-04 VITALS
WEIGHT: 218 LBS | HEART RATE: 66 BPM | BODY MASS INDEX: 27.98 KG/M2 | HEIGHT: 74 IN | OXYGEN SATURATION: 98 % | SYSTOLIC BLOOD PRESSURE: 132 MMHG | DIASTOLIC BLOOD PRESSURE: 75 MMHG | TEMPERATURE: 96.98 F

## 2022-03-04 PROCEDURE — 99214 OFFICE O/P EST MOD 30 MIN: CPT | Mod: 25

## 2022-03-04 PROCEDURE — 94010 BREATHING CAPACITY TEST: CPT

## 2022-03-04 NOTE — HISTORY OF PRESENT ILLNESS
[de-identified] : 71 year old male follow with Type 2 diabetes, chronic anosmia, Aspirin Exacerbated Respiratory Disease AERD with two FESS with polypectomy 2013 and 2017, On dupixent (started Jun, 2021) asthma and SNHL here for PFT. \par \par 2017, he was placed on budesonide rinse, no polyp regrowth. However his sense of smell never returned. Jun 2021 we started DUpilumab and Xhance was started with continuation of budesonide however sense of smell never returned.  ENT wit\par March ENT appointment- No polyps. As per ENT notes, after completing the two dupilumab the patient will D/c dupi and follow up with ENT in two to three months to check if there is regrowth of polyps. \par \par Interval history:\par Currently admits to no sense of smell. On xhance, budesonide and dupilumab. \par Asthma: well controlled with Flovent two puff qd and dupilumab. Denies any chest tightness, wheezing or shortness of breath. \par ACT 25\par SNOT(22)-11- 3/4/2021\par \par \par \par CHRONIC RHINOSINUSITIS: \par He was diagnoses with nasal polyps in 2008. His first sinus surgery with polypectomy was on 2013, with improvement of nasal congestion. Approximately, two years later he redeveloped nasal congestion which led to second sinus surgery with polypectomy on March 2017, subsequently he was placed on budesonide rinse. Currently denies nasal congestion. However admits to anosmia. Admits to intermittent sense of taste. Recent rhinoscopy did not reveal nasal polyps. \par \par SNOT (22)-11\par \par Past History: \par 2014, he was diagnosed with Asthma. He is currently on Montelukast. For past few months, he has had gurgling in his chest and morning cough, which self resolves. Denies any chest tightness, shortness of breath or exertional wheezing. Last use of albuterol was yesterday, secondary to dry cough, which resolved with albuterol. ACT 22. \par Oral steroid use: No recent use of oral steroids. Last use was few years ago, due to nasal polyps, with improvement of nasal congestion. \par \par \par Eight years ago, he developed chest tightness with Aspirin and Motrin. Hence he avoids NSAIDS. \par \par Denies ocular pruritus or rhinorrhea with change of season. Took Claritin two days ago, for sneezing. \par \par No increase nasal congestion with alcohol.\par \par No history of urticaria. \par \par Last year, he was admitted to the general floor for COVID 19 infection. He was treated in hospital with hydroxychloroquine, Zithromax and oxygen. He was discharged within four days in stable condition. \par \par - Patient took COVID 19 vaccine- FEB/2012 and March/2021\par \par \par No history or symptoms of eczematous rashes, venom reactions, food allergies. \par \par \par Completed three  mRna COVID 19 vaccine. \par

## 2022-03-04 NOTE — REVIEW OF SYSTEMS
[Nl] : Integumentary [Fatigue] : no fatigue [Fever] : no fever [Eye Discharge] : no eye discharge [Eye Redness] : no redness [Puffy Eyelids] : no puffy ~T eyelids [Bloodshot Eyes] : no bloodshot ~T eyes [Vomiting] : no vomiting [FreeTextEntry4] : see HPI  [FreeTextEntry6] : see HPI

## 2022-03-04 NOTE — IMPRESSION
[Spirometry] : Spirometry [FreeTextEntry2] : FENO- 24- 3/4/2022\par              67-5/5/2021. \par \par

## 2022-03-04 NOTE — PHYSICAL EXAM
[Alert] : alert [Well Nourished] : well nourished [No Acute Distress] : no acute distress [Well Developed] : well developed [Sclera Not Icteric] : sclera not icteric [Normal TMs] : both tympanic membranes were normal [Normal Rate and Effort] : normal respiratory rhythm and effort [No Crackles] : no crackles [Bilateral Audible Breath Sounds] : bilateral audible breath sounds [Normal Rate] : heart rate was normal  [Normal S1, S2] : normal S1 and S2 [No murmur] : no murmur [Regular Rhythm] : with a regular rhythm [Normal Cervical Lymph Nodes] : cervical [Normal Mood] : mood was normal [Normal Affect] : affect was normal [Judgment and Insight Age Appropriate] : judgement and insight is age appropriate [Alert, Awake, Oriented as Age-Appropriate] : alert, awake, oriented as age appropriate [Conjunctival Erythema] : no conjunctival erythema [Boggy Nasal Turbinates] : no boggy and/or pale nasal turbinates [Pharyngeal erythema] : no pharyngeal erythema [Posterior Pharyngeal Cobblestoning] : no posterior pharyngeal cobblestoning [Clear Rhinorrhea] : no clear rhinorrhea was seen [Exudate] : no exudate [Wheezing] : no wheezing was heard

## 2022-03-14 LAB
ALBUMIN SERPL ELPH-MCNC: 4.9 G/DL
ALP BLD-CCNC: 67 U/L
ALT SERPL-CCNC: 25 U/L
ANION GAP SERPL CALC-SCNC: 13 MMOL/L
AST SERPL-CCNC: 19 U/L
BASOPHILS # BLD AUTO: 0.04 K/UL
BASOPHILS NFR BLD AUTO: 0.5 %
BILIRUB SERPL-MCNC: 0.4 MG/DL
BUN SERPL-MCNC: 21 MG/DL
CALCIUM SERPL-MCNC: 10 MG/DL
CHLORIDE SERPL-SCNC: 105 MMOL/L
CO2 SERPL-SCNC: 24 MMOL/L
CREAT SERPL-MCNC: 1.02 MG/DL
EGFR: 79 ML/MIN/1.73M2
EOSINOPHIL # BLD AUTO: 0.49 K/UL
EOSINOPHIL NFR BLD AUTO: 6 %
GLUCOSE SERPL-MCNC: 94 MG/DL
HCT VFR BLD CALC: 44.6 %
HGB BLD-MCNC: 14.4 G/DL
IMM GRANULOCYTES NFR BLD AUTO: 0.2 %
LEUKOTRIENE E4, URINE: NORMAL CD:455662145
LYMPHOCYTES # BLD AUTO: 2.28 K/UL
LYMPHOCYTES NFR BLD AUTO: 27.9 %
MAN DIFF?: NORMAL
MCHC RBC-ENTMCNC: 30.4 PG
MCHC RBC-ENTMCNC: 32.3 GM/DL
MCV RBC AUTO: 94.1 FL
MONOCYTES # BLD AUTO: 0.82 K/UL
MONOCYTES NFR BLD AUTO: 10 %
NEUTROPHILS # BLD AUTO: 4.52 K/UL
NEUTROPHILS NFR BLD AUTO: 55.4 %
PLATELET # BLD AUTO: 240 K/UL
POTASSIUM SERPL-SCNC: 4.7 MMOL/L
PROT SERPL-MCNC: 7.7 G/DL
RBC # BLD: 4.74 M/UL
RBC # FLD: 13.5 %
SODIUM SERPL-SCNC: 143 MMOL/L
TOTAL IGE SMQN RAST: 64 KU/L
WBC # FLD AUTO: 8.17 K/UL

## 2022-05-04 ENCOUNTER — RX RENEWAL (OUTPATIENT)
Age: 72
End: 2022-05-04

## 2022-05-11 ENCOUNTER — APPOINTMENT (OUTPATIENT)
Dept: OTOLARYNGOLOGY | Facility: CLINIC | Age: 72
End: 2022-05-11
Payer: MEDICARE

## 2022-05-11 VITALS — SYSTOLIC BLOOD PRESSURE: 115 MMHG | HEART RATE: 62 BPM | DIASTOLIC BLOOD PRESSURE: 68 MMHG | TEMPERATURE: 97.3 F

## 2022-05-11 PROCEDURE — 99213 OFFICE O/P EST LOW 20 MIN: CPT | Mod: 25

## 2022-05-11 PROCEDURE — 31231 NASAL ENDOSCOPY DX: CPT

## 2022-05-11 NOTE — PROCEDURE
[FreeTextEntry6] : Afrin and lidocaine were topically sprayed. Flexible scope #38 was used. Right nasal passage with normal inferior, middle and superior turbinates. Nasal passage patient with clear middle meatus, clear max antrostomy with clear mucosa, clear sphenoid and clear sphenoethmoid recess. Left nasal passage with normal inferior, middle, and superior turbinates. nasal passage was patent with clear middle meatus and widely patent max antrostomy with quiescent mucosa and clear sphenoethmoid recess. No mucopurulence or polyps. Nasopharynx clear.

## 2022-05-11 NOTE — ASSESSMENT
[FreeTextEntry1] : 70 yo male with hx of nasal polyps; Samter's triad, s/p 2 Fess 20 years ago.  Continued Hyposmia despite dupixent trial and adding xhance to his budesonide irrigation daily:\par - Continue Budesonide in his saline rinse daily\par - Continue Smell training to see if any improvement\par - Can d/c Xhance since was not effective; he is off the dupixent\par - Continue to f/u with Dr. Barajas\par - F/U in 6 months\par \par I personally saw and examined Mr. NAINA GLEZ in detail this visit today. I personally reviewed the HPI, PMH, FH, SH, ROS and medications/allergies. I have spoken to SARTHAK Loera regarding the history and have personally determined the assessment and plan of care, and documented this myself. I was present and participated in all key portions of the encounter and all procedures noted above. I have made changes in the body of the note where appropriate.\par \par Attesting Faculty: See Attending Signature Below \par \par

## 2022-05-11 NOTE — HISTORY OF PRESENT ILLNESS
[de-identified] : Patient with a history of Samters Triad last seen in December 2021 for issues with sense of smell related to history of polyposis. He had FESS with Dr Ruvalcaba 20 years ago. \par Patient has been doing the Xhance and the budesonide \par D/C'ed Dupixent 2 months ago.  Continues inhalers as per Dr Barajas. Has been doing smell training.\par He denies nasal congestion, sinus pressure and issues breathing through his nose, however, still no sense of smell. \par

## 2022-09-27 ENCOUNTER — NON-APPOINTMENT (OUTPATIENT)
Age: 72
End: 2022-09-27

## 2022-10-26 ENCOUNTER — RX RENEWAL (OUTPATIENT)
Age: 72
End: 2022-10-26

## 2022-11-09 ENCOUNTER — APPOINTMENT (OUTPATIENT)
Dept: OTOLARYNGOLOGY | Facility: CLINIC | Age: 72
End: 2022-11-09

## 2022-11-09 VITALS
HEART RATE: 64 BPM | SYSTOLIC BLOOD PRESSURE: 108 MMHG | DIASTOLIC BLOOD PRESSURE: 69 MMHG | TEMPERATURE: 98.3 F | WEIGHT: 210 LBS | HEIGHT: 74 IN | BODY MASS INDEX: 26.95 KG/M2

## 2022-11-09 DIAGNOSIS — J32.2 CHRONIC ETHMOIDAL SINUSITIS: ICD-10-CM

## 2022-11-09 PROCEDURE — 31231 NASAL ENDOSCOPY DX: CPT

## 2022-11-09 PROCEDURE — 99213 OFFICE O/P EST LOW 20 MIN: CPT | Mod: 25

## 2022-11-09 NOTE — END OF VISIT
[FreeTextEntry3] : I personally saw and examined . NAINA GLEZ in detail this visit today. I personally reviewed the HPI, PMH, FH, SH, ROS and medications/allergies. I have spoken to SARTHAK Loera regarding the history and have personally determined the assessment and plan of care, and documented this myself. I was present and participated in all key portions of the encounter and all procedures noted above. I have made changes in the body of the note where appropriate.\par \par Attesting Faculty: See Attending Signature Below

## 2022-11-09 NOTE — PROCEDURE
[FreeTextEntry6] : Flexible scope #36 was used. Right nasal passage with normal inferior, middle and superior turbinates. Nasal passage patient with clear middle meatus, clear max antrostomy with clear mucosa, clear sphenoid and clear sphenoethmoid recess. Left nasal passage with normal inferior, middle, and superior turbinates. nasal passage was patent with clear middle meatus and widely patent max antrostomy with quiescent mucosa and thick mucoid drainage from patent sphenoid os. Ethmoid roof and olfactory cleft with edematous mucosa but no amber polyps. Nasopharynx clear.

## 2022-11-09 NOTE — ASSESSMENT
[FreeTextEntry1] : 72 yo male with hx of nasal polyps; Samter's triad, s/p 2 Fess 20 years ago and 2017.  Continued Hyposmia despite dupixent trial and adding xhance to his budesonide irrigation daily:\par - Continue Budesonide in his saline rinse daily\par - Continue Smell training to see if any improvement\par - Continue to f/u with Dr. Barajas, Can consider different Biologic although he is asymptomatic other than hyposmia so he is inclined to continue observation for now\par - F/U in 6 months\par

## 2022-11-09 NOTE — HISTORY OF PRESENT ILLNESS
[de-identified] : 71 year old male follow with Type 2 diabetes, chronic anosmia, Aspirin Exacerbated Respiratory Disease AERD with two FESS with polypectomy 2013 and 2017, On dupixent (started Jun, 2021) asthma and SNHL here for PFT. \par \par 2017, he was placed on budesonide rinse, no polyp regrowth. However his sense of smell never returned. Jun 2021 we started DUpilumab and Xhance was started with continuation of budesonide however sense of smell never returned.  ENT wit\par March ENT appointment- No polyps. As per ENT notes, after completing the two dupilumab the patient will D/c dupi and follow up with ENT in two to three months to check if there is regrowth of polyps. \par \par Interval history:\par Currently admits to no sense of smell. On xhance, budesonide and dupilumab. \par Asthma: well controlled with Flovent two puff qd and dupilumab. Denies any chest tightness, wheezing or shortness of breath. \par ACT 25\par SNOT(22)-11- 3/4/2021\par \par \par \par CHRONIC RHINOSINUSITIS: \par He was diagnoses with nasal polyps in 2008. His first sinus surgery with polypectomy was on 2013, with improvement of nasal congestion. Approximately, two years later he redeveloped nasal congestion which led to second sinus surgery with polypectomy on March 2017, subsequently he was placed on budesonide rinse. Currently denies nasal congestion. However admits to anosmia. Admits to intermittent sense of taste. Recent rhinoscopy did not reveal nasal polyps. \par \par SNOT (22)-11\par \par Past History: \par 2014, he was diagnosed with Asthma. He is currently on Montelukast. For past few months, he has had gurgling in his chest and morning cough, which self resolves. Denies any chest tightness, shortness of breath or exertional wheezing. Last use of albuterol was yesterday, secondary to dry cough, which resolved with albuterol. ACT 22. \par Oral steroid use: No recent use of oral steroids. Last use was few years ago, due to nasal polyps, with improvement of nasal congestion. \par \par \par Eight years ago, he developed chest tightness with Aspirin and Motrin. Hence he avoids NSAIDS. \par \par Denies ocular pruritus or rhinorrhea with change of season. Took Claritin two days ago, for sneezing. \par \par No increase nasal congestion with alcohol.\par \par No history of urticaria. \par \par Last year, he was admitted to the general floor for COVID 19 infection. He was treated in hospital with hydroxychloroquine, Zithromax and oxygen. He was discharged within four days in stable condition. \par \par - Patient took COVID 19 vaccine- FEB/2012 and March/2021\par \par \par No history or symptoms of eczematous rashes, venom reactions, food allergies. \par \par \par Completed three  mRna COVID 19 vaccine. \par  [de-identified] : Patient with a history of Samters Triad last seen in Feb 2022 for issues with sense of smell related to history of polyposis. He had FESS with Dr Ruvalcaba 20 years ago and again in 2017. \par Is using Sinus wash with budesonide. \par D/C'ed Dupixent and Xhance since did not help recover smell.  Continues inhalers as per Dr Turner. Has been doing smell training. He was not considered for asa desensitization by Dr. turner secondary to his age.\par He denies nasal congestion, sinus pressure and issues breathing through his nose, however, still no sense of smell. \par

## 2022-11-09 NOTE — REVIEW OF SYSTEMS
[Negative] : Respiratory [Nl] : Integumentary [Fatigue] : no fatigue [Fever] : no fever [Eye Discharge] : no eye discharge [Eye Redness] : no redness [Puffy Eyelids] : no puffy ~T eyelids [Bloodshot Eyes] : no bloodshot ~T eyes [Vomiting] : no vomiting [FreeTextEntry4] : see HPI  [FreeTextEntry6] : see HPI

## 2022-12-15 NOTE — H&P PST ADULT - LAST ECHOCARDIOGRAM
over 10 year ago Fluconazole Pregnancy And Lactation Text: This medication is Pregnancy Category C and it isn't know if it is safe during pregnancy. It is also excreted in breast milk.

## 2023-05-26 ENCOUNTER — RX RENEWAL (OUTPATIENT)
Age: 73
End: 2023-05-26

## 2023-06-21 ENCOUNTER — APPOINTMENT (OUTPATIENT)
Dept: GASTROENTEROLOGY | Facility: CLINIC | Age: 73
End: 2023-06-21
Payer: MEDICARE

## 2023-06-21 VITALS
DIASTOLIC BLOOD PRESSURE: 74 MMHG | BODY MASS INDEX: 27.59 KG/M2 | HEIGHT: 74 IN | OXYGEN SATURATION: 98 % | TEMPERATURE: 97.2 F | HEART RATE: 67 BPM | SYSTOLIC BLOOD PRESSURE: 136 MMHG | WEIGHT: 215 LBS

## 2023-06-21 DIAGNOSIS — Z12.11 ENCOUNTER FOR SCREENING FOR MALIGNANT NEOPLASM OF COLON: ICD-10-CM

## 2023-06-21 PROCEDURE — 99203 OFFICE O/P NEW LOW 30 MIN: CPT

## 2023-06-21 NOTE — PHYSICAL EXAM
[Alert] : alert [Normal Voice/Communication] : normal voice/communication [Healthy Appearing] : healthy appearing [No Acute Distress] : no acute distress [Sclera] : the sclera and conjunctiva were normal [No Neck Mass] : no neck mass was observed [Normal Appearance] : the appearance of the neck was normal [No Respiratory Distress] : no respiratory distress [No Acc Muscle Use] : no accessory muscle use [Auscultation Breath Sounds / Voice Sounds] : lungs were clear to auscultation bilaterally [Respiration, Rhythm And Depth] : normal respiratory rhythm and effort [Heart Rate And Rhythm] : heart rate was normal and rhythm regular [Normal S1, S2] : normal S1 and S2 [Murmurs] : no murmurs [None] : no edema [Bowel Sounds] : normal bowel sounds [Abdomen Tenderness] : non-tender [No Masses] : no abdominal mass palpated [Abdomen Soft] : soft [] : no hepatosplenomegaly [Cervical Lymph Nodes Enlarged Posterior Bilaterally] : no posterior cervical lymphadenopathy [Supraclavicular Lymph Nodes Enlarged Bilaterally] : no supraclavicular lymphadenopathy [Cervical Lymph Nodes Enlarged Anterior Bilaterally] : no anterior cervical lymphadenopathy [Abnormal Walk] : normal gait [No Clubbing, Cyanosis] : no clubbing or cyanosis of the fingernails [Normal Color / Pigmentation] : normal skin color and pigmentation [Oriented To Time, Place, And Person] : oriented to person, place, and time [Normal Affect] : the affect was normal [Normal Insight/Judgment] : insight and judgment were intact [Normal Mood] : the mood was normal [de-identified] : Mild diastasis recti noted. [de-identified] : Trace left tibial edema.

## 2023-06-21 NOTE — REASON FOR VISIT
[Consultation] : a consultation visit [Spouse] : spouse [FreeTextEntry1] : for evaluation as to timing of follow-up colonoscopy.

## 2023-06-21 NOTE — ASSESSMENT
[FreeTextEntry1] : Impression:\par \par #1 colon cancer screening-rule out colonic neoplasm. The patient is currently asymptomatic from a GI standpoint.  Colonoscopy examinations 5/2016 and 7/2009 without detection of colonic polyps.  Question as to history of colon polyp at prior remote colonoscopy.\par \par #2 pancolonic diverticulosis.\par \par #3 diabetes.\par \par #4 asthma.\par \par #5 history of nasal polyps.

## 2023-06-21 NOTE — CONSULT LETTER
[Dear  ___] : Dear  [unfilled], [Consult Letter:] : I had the pleasure of evaluating your patient, [unfilled]. [( Thank you for referring [unfilled] for consultation for _____ )] : Thank you for referring [unfilled] for consultation for [unfilled] [Please see my note below.] : Please see my note below. [Consult Closing:] : Thank you very much for allowing me to participate in the care of this patient.  If you have any questions, please do not hesitate to contact me. [Sincerely,] : Sincerely, [FreeTextEntry2] : Dr. Sherry Ramírez [FreeTextEntry3] : Avtar Little MD

## 2023-06-21 NOTE — HISTORY OF PRESENT ILLNESS
[FreeTextEntry1] : Office consultation on 6/21/2023.\par \par The patient is a 73-year-old man evaluated for consultation regarding timing of follow-up colonoscopy.\par \par The patient has 1 formed bowel movement daily without any current complaints of diarrhea, constipation, change in bowel habit or rectal bleeding.\par \par Appetite and weight are stable.  Denies complaints of dysphagia, heartburn, nausea, vomiting or abdominal pain.\par \par The patient reports having had colon polyps removed at a previous colonoscopy examination. Those records are not available for review and will be requested.\par \par Colonoscopy was on 7/24/09 revealed pancolonic diverticulosis. Colonic polyps were not detected at that examination.\par \par Colonoscopy on 5/16/2016 was noted for pancolonic diverticulosis.  Colonic polyps not detected.\par \par The patient reports no past history of digestive illness. There is no family history of colon cancer.

## 2023-08-02 ENCOUNTER — NON-APPOINTMENT (OUTPATIENT)
Age: 73
End: 2023-08-02

## 2023-08-02 ENCOUNTER — APPOINTMENT (OUTPATIENT)
Dept: PEDIATRIC ALLERGY IMMUNOLOGY | Facility: CLINIC | Age: 73
End: 2023-08-02
Payer: MEDICARE

## 2023-08-02 VITALS
BODY MASS INDEX: 27.59 KG/M2 | WEIGHT: 215 LBS | SYSTOLIC BLOOD PRESSURE: 126 MMHG | HEIGHT: 74 IN | OXYGEN SATURATION: 98 % | DIASTOLIC BLOOD PRESSURE: 74 MMHG | HEART RATE: 72 BPM | TEMPERATURE: 97.3 F

## 2023-08-02 DIAGNOSIS — J30.89 OTHER ALLERGIC RHINITIS: ICD-10-CM

## 2023-08-02 PROCEDURE — 95012 NITRIC OXIDE EXP GAS DETER: CPT

## 2023-08-02 PROCEDURE — 99214 OFFICE O/P EST MOD 30 MIN: CPT | Mod: 25

## 2023-08-02 PROCEDURE — 94010 BREATHING CAPACITY TEST: CPT

## 2023-08-02 NOTE — HISTORY OF PRESENT ILLNESS
[de-identified] :     71 year old male follow with Type 2 diabetes, chronic anosmia, Aspirin Exacerbated Respiratory Disease AERD with two FESS with polypectomy 2013 and 2017, On dupixent (started Jun, 2021) asthma and SNHL here for PFT.  =Went on dupilumab for anosmia. Was on dupilumab from Jun 2021- Jan 2023- Still had anosmia, thus d/c =No polyps post second FESS- 2017  Interval history: -Was on dupilumab from Jun 2021- Jan 2023- Still had anosmia, thus d/c.  -s/p FESS 2017 there was no polyp regrowth.  No ENT apt since d/c of dupilumab.  -On budesonide rinse.  -Asthma is well controlled. No maintenance medication.      Past history.  CHRONIC RHINOSINUSITIS:  He was diagnoses with nasal polyps in 2008. His first sinus surgery with polypectomy was on 2013, with improvement of nasal congestion. Approximately, two years later he redeveloped nasal congestion which led to second sinus surgery with polypectomy on March 2017, subsequently he was placed on budesonide rinse. Currently denies nasal congestion. However admits to anosmia. Admits to intermittent sense of taste. Recent rhinoscopy did not reveal nasal polyps.    SNOT (22)-11    Past History:  2014, he was diagnosed with Asthma. He is currently on Montelukast. For past few months, he has had gurgling in his chest and morning cough, which self resolves. Denies any chest tightness, shortness of breath or exertional wheezing. Last use of albuterol was yesterday, secondary to dry cough, which resolved with albuterol. ACT 22.  Oral steroid use: No recent use of oral steroids. Last use was few years ago, due to nasal polyps, with improvement of nasal congestion.      Eight years ago, he developed chest tightness with Aspirin and Motrin. Hence he avoids NSAIDS.    Denies ocular pruritus or rhinorrhea with change of season. Took Claritin two days ago, for sneezing.    No increase nasal congestion with alcohol.    No history of urticaria.    Last year, he was admitted to the general floor for COVID 19 infection. He was treated in hospital with hydroxychloroquine, Zithromax and oxygen. He was discharged within four days in stable condition.    - Patient took COVID 19 vaccine- FEB/2012 and March/2021      No history or symptoms of eczematous rashes, venom reactions, food allergies.      Completed three mRna COVID 19 vaccine.

## 2023-08-02 NOTE — END OF VISIT
[FreeTextEntry3] : I, Dr. Diamond Barajas, personally performed the evaluation and management (E/M) services for this established patient who presents today with (a) new problem(s)/exacerbation of (an) existing condition(s).  That E/M includes conducting the examination, assessing all new/exacerbated conditions, and establishing a new plan of care.  Today, my COLE, [employer], was here to observe my evaluation and management services for this new problem/exacerbated condition to be followed going forward.   [Time Spent: ___ minutes] : I have spent [unfilled] minutes of time on the encounter.

## 2023-08-02 NOTE — PHYSICAL EXAM
[Alert] : alert [Well Nourished] : well nourished [No Acute Distress] : no acute distress [Well Developed] : well developed [Sclera Not Icteric] : sclera not icteric [Normal TMs] : both tympanic membranes were normal [Normal Tonsils] : normal tonsils [Normal Rate and Effort] : normal respiratory rhythm and effort [No Crackles] : no crackles [Bilateral Audible Breath Sounds] : bilateral audible breath sounds [Normal Rate] : heart rate was normal  [Normal S1, S2] : normal S1 and S2 [No murmur] : no murmur [Regular Rhythm] : with a regular rhythm [Skin Intact] : skin intact  [No Rash] : no rash [Normal Mood] : mood was normal [Conjunctival Erythema] : no conjunctival erythema [Boggy Nasal Turbinates] : no boggy and/or pale nasal turbinates [Pharyngeal erythema] : no pharyngeal erythema [Posterior Pharyngeal Cobblestoning] : no posterior pharyngeal cobblestoning [Clear Rhinorrhea] : no clear rhinorrhea was seen [Exudate] : no exudate [Wheezing] : no wheezing was heard [Patches] : no patches [Urticaria] : no urticaria

## 2023-08-02 NOTE — REVIEW OF SYSTEMS
[Nl] : Integumentary [Fatigue] : no fatigue [Fever] : no fever [Eye Discharge] : no eye discharge [Eye Redness] : no redness [Puffy Eyelids] : no puffy ~T eyelids [Bloodshot Eyes] : no bloodshot ~T eyes [Rhinorrhea] : no rhinorrhea [Nasal Congestion] : no nasal congestion [Post Nasal Drip] : no post nasal drip [Cough] : no cough [Nocturnal Awakening] : no nocturnal awakening with shortness of breath [Wheezing Worsens With Exercise] : wheezing does not worsen with exercise [Wheezing] : no wheezing

## 2023-09-01 LAB
2,3-DINOR-11B-PROSTAGLANDIN F2A, RANDOM URINE: 723 PG/MG CR
CREATININE RANDOM URINE: 220 MG/DL
CREATININE RANDOM URINE: 225 MG/DL
LEUKOTRIENE E4, URINE: 378 PG/MG CR
TOTAL IGE SMQN RAST: 159 KU/L
TRYPTASE: 7.4 UG/L

## 2023-12-05 RX ORDER — FLUTICASONE PROPIONATE 110 UG/1
110 AEROSOL, METERED RESPIRATORY (INHALATION) TWICE DAILY
Qty: 3 | Refills: 1 | Status: COMPLETED | COMMUNITY
Start: 2021-05-05 | End: 2023-12-05

## 2023-12-05 RX ORDER — BECLOMETHASONE DIPROPIONATE HFA 40 UG/1
40 AEROSOL, METERED RESPIRATORY (INHALATION) TWICE DAILY
Qty: 1 | Refills: 2 | Status: ACTIVE | COMMUNITY
Start: 2023-12-05 | End: 1900-01-01

## 2024-03-20 ENCOUNTER — APPOINTMENT (OUTPATIENT)
Dept: OTOLARYNGOLOGY | Facility: CLINIC | Age: 74
End: 2024-03-20
Payer: MEDICARE

## 2024-03-20 VITALS
WEIGHT: 215 LBS | DIASTOLIC BLOOD PRESSURE: 70 MMHG | TEMPERATURE: 97.6 F | HEIGHT: 74 IN | HEART RATE: 69 BPM | SYSTOLIC BLOOD PRESSURE: 129 MMHG | BODY MASS INDEX: 27.59 KG/M2

## 2024-03-20 DIAGNOSIS — R43.0 ANOSMIA: ICD-10-CM

## 2024-03-20 PROCEDURE — 31231 NASAL ENDOSCOPY DX: CPT

## 2024-03-20 PROCEDURE — 99214 OFFICE O/P EST MOD 30 MIN: CPT | Mod: 25

## 2024-03-20 RX ORDER — PREDNISONE 20 MG/1
20 TABLET ORAL
Qty: 10 | Refills: 0 | Status: COMPLETED | COMMUNITY
Start: 2023-08-02 | End: 2024-03-20

## 2024-03-20 RX ORDER — AZITHROMYCIN 250 MG/1
250 TABLET, FILM COATED ORAL DAILY
Qty: 1 | Refills: 1 | Status: ACTIVE | COMMUNITY
Start: 2024-03-20 | End: 1900-01-01

## 2024-03-22 RX ORDER — BUDESONIDE 0.5 MG/2ML
0.5 INHALANT ORAL
Qty: 3 | Refills: 3 | Status: ACTIVE | COMMUNITY
Start: 2024-03-20 | End: 1900-01-01

## 2024-03-23 NOTE — ASSESSMENT
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  Continued Hyposmia despite dupixent trial and adding xhance to his budesonide irrigation daily: - Continue Budesonide in his saline rinse but use twice twice daily - azithromycin low dose for 1 month trial - f/u 1 month

## 2024-03-23 NOTE — REVIEW OF SYSTEMS
[Negative] : Respiratory [Nl] : no history of recurrent infections of the sinuses,ear, throat, lungs (pneumonia/ bronchitis) or skin [Fatigue] : no fatigue [Eye Discharge] : no eye discharge [Fever] : no fever [Eye Redness] : no redness [Puffy Eyelids] : no puffy ~T eyelids [Bloodshot Eyes] : no bloodshot ~T eyes [FreeTextEntry4] : see HPI  [Vomiting] : no vomiting [FreeTextEntry6] : see HPI

## 2024-03-23 NOTE — HISTORY OF PRESENT ILLNESS
[de-identified] : Patient with a history of Samters Triad last seen in Nov 2022 for issues with sense of smell related to history of polyposis. He had FESS with Dr Ruvalcaba 20 years ago.  Patient had tried xhance without improvement so stopped. Has been on budesonide rinses once a day. Tried and failed Dupixent for smell.  Continues flovent as per Dr Barajas. Has been doing smell training for several months but never improved.  He denies nasal congestion, sinus pressure and issues breathing through his nose, however, still no sense of smell.

## 2024-03-26 RX ORDER — BUDESONIDE 0.5 MG/2ML
0.5 INHALANT ORAL
Qty: 360 | Refills: 0 | Status: ACTIVE | COMMUNITY
Start: 2021-06-24 | End: 1900-01-01

## 2024-05-01 ENCOUNTER — APPOINTMENT (OUTPATIENT)
Dept: OTOLARYNGOLOGY | Facility: CLINIC | Age: 74
End: 2024-05-01
Payer: MEDICARE

## 2024-05-01 VITALS — SYSTOLIC BLOOD PRESSURE: 127 MMHG | TEMPERATURE: 97.9 F | DIASTOLIC BLOOD PRESSURE: 74 MMHG | HEART RATE: 64 BPM

## 2024-05-01 DIAGNOSIS — J33.9 UNSPECIFIED ASTHMA, UNCOMPLICATED: ICD-10-CM

## 2024-05-01 DIAGNOSIS — Z88.6 UNSPECIFIED ASTHMA, UNCOMPLICATED: ICD-10-CM

## 2024-05-01 DIAGNOSIS — J30.1 ALLERGIC RHINITIS DUE TO POLLEN: ICD-10-CM

## 2024-05-01 DIAGNOSIS — Z88.6 ALLERGY STATUS TO ANALGESIC AGENT: ICD-10-CM

## 2024-05-01 DIAGNOSIS — J33.9 NASAL POLYP, UNSPECIFIED: ICD-10-CM

## 2024-05-01 DIAGNOSIS — J45.909 UNSPECIFIED ASTHMA, UNCOMPLICATED: ICD-10-CM

## 2024-05-01 PROCEDURE — 31231 NASAL ENDOSCOPY DX: CPT

## 2024-05-01 PROCEDURE — 99214 OFFICE O/P EST MOD 30 MIN: CPT | Mod: 25

## 2024-05-01 RX ORDER — BUDESONIDE, MICRONIZED 100 %
POWDER (GRAM) MISCELLANEOUS
Qty: 108 | Refills: 3 | Status: ACTIVE | COMMUNITY
Start: 2024-05-01 | End: 1900-01-01

## 2024-05-01 NOTE — HISTORY OF PRESENT ILLNESS
[de-identified] : Patient with a history of Samters Triad last seen in Nov 2022 for issues with sense of smell related to history of polyposis. He had FESS with Dr Ruvalcaba 20 years ago.  Tried xhance without improvement so stopped. Has been on budesonide rinses BID a day. Tried and failed Dupixent for smell. Continues flovent as per Dr Barajas. Has been doing smell training for several months but never improved. He denies nasal congestion, sinus pressure and issues breathing through his nose, however, still no sense of smell. [FreeTextEntry1] : At last Visit noted to have some regrowth of nasal polyps and nasal congestion.  Has increased Budesonide wash to BID.  Feels that congestion has improve. Has been on azithromycin daily for a month also. Just picked up a refill. Still no smell.

## 2024-05-01 NOTE — PROCEDURE
[FreeTextEntry6] : Flexible scope #24 was used. Right nasal passage with small polypoid change anterior surface of inferior turbinate.  Normal middle and superior turbinates. Nasal passage patent with clear middle meatus, patent max sinus with quiescent mucosa and clear sphenoethmoid recess. Moderate polyp noted at frontal recess. Left nasal passage with normal inferior, middle and superior turbinates. Nasal passage was patent with clear middle meatus and patent max antrostomy with quiescent mucosa, clear sphenoethmoid recess. No mucopurulence appreciated. Nasopharynx clear.

## 2024-05-01 NOTE — ASSESSMENT
[FreeTextEntry1] : 74 y/o M with Sempters triad, failed, xhance and Dupixant.  At last visit was noted to have some polypoid re-growth.  Increased his Budesonide to BID in sinus wash.  Now notes nasal congestion is improved.  Still with Anosmia, unchanged in 5+ years: - On nasal endoscopy right frontal sinus recess with polyp, remainder of nasal cavity and sinuses are clear. - Continue Budesonide in sinus wash (will see if can get it covered with [facility]) - Continue low dose azithromycin, just got his refill so can now go down to every other day for one more month then stop - F/U 3-4 months to recheck

## 2024-05-01 NOTE — END OF VISIT
[FreeTextEntry3] : I personally saw and examined . NAINA GLEZ in detail this visit today. I personally reviewed the HPI, PMH, FH, SH, ROS and medications/allergies. I have spoken to SARTHAK Loera regarding the history and have personally determined the assessment and plan of care, and documented this myself. I was present and participated in all key portions of the encounter and all procedures noted above. I have made changes in the body of the note where appropriate.  Attesting Faculty: See Attending Signature Below

## 2024-08-28 ENCOUNTER — APPOINTMENT (OUTPATIENT)
Dept: OTOLARYNGOLOGY | Facility: CLINIC | Age: 74
End: 2024-08-28
Payer: MEDICARE

## 2024-08-28 VITALS
SYSTOLIC BLOOD PRESSURE: 117 MMHG | BODY MASS INDEX: 25.03 KG/M2 | DIASTOLIC BLOOD PRESSURE: 71 MMHG | HEIGHT: 74 IN | TEMPERATURE: 97.6 F | HEART RATE: 83 BPM | WEIGHT: 195 LBS

## 2024-08-28 DIAGNOSIS — R43.0 ANOSMIA: ICD-10-CM

## 2024-08-28 DIAGNOSIS — J32.2 CHRONIC ETHMOIDAL SINUSITIS: ICD-10-CM

## 2024-08-28 DIAGNOSIS — J33.9 NASAL POLYP, UNSPECIFIED: ICD-10-CM

## 2024-08-28 PROCEDURE — 99214 OFFICE O/P EST MOD 30 MIN: CPT | Mod: 25

## 2024-08-28 PROCEDURE — 31231 NASAL ENDOSCOPY DX: CPT

## 2024-08-28 RX ORDER — MOMETASONE FUROATE 100 %
POWDER (GRAM) MISCELLANEOUS
Qty: 180 | Refills: 3 | Status: ACTIVE | COMMUNITY
Start: 2024-08-28 | End: 1900-01-01

## 2024-08-28 NOTE — END OF VISIT
[FreeTextEntry3] : I personally saw and examined . NAINA GLEZ in detail this visit today. I personally reviewed the HPI, PMH, FH, SH, ROS and medications/allergies. I have spoken to SARTHAK Rubin regarding the history and have personally determined the assessment and plan of care, and documented this myself. I was present and participated in all key portions of the encounter and all procedures noted above. I have made changes in the body of the note where appropriate.   Attesting Faculty: See Attending Signature Below

## 2024-08-28 NOTE — HISTORY OF PRESENT ILLNESS
[de-identified] : Patient with a history of Samters Triad last seen in Nov 2022 for issues with sense of smell related to history of polyposis. He had FESS with Dr Ruvalcaba 20 years ago.  Tried xhance without improvement so stopped. Has been on budesonide rinses BID a day. Has not had sense of smell for over 20 years. Taste he reports is normal. Tried and failed Dupixent for smell. Continues flovent as per Dr Barajas. Has been doing smell training for several months but never improved. He denies nasal congestion, sinus pressure and issues breathing through his nose, however, still no sense of smell. [FreeTextEntry1] : He has been here for f/u. He has been using the budesonide in his rinses bid. He is no longer on abx - completed course of low dose azithromycin with good response. He has no nasal congestion, runny nose, or PND. He has no facial pain or pressure.  he has no recent sinus infections. Still has no smell

## 2024-08-28 NOTE — PROCEDURE
[FreeTextEntry6] : Reason for nasal endoscopy: anterior rhinoscopy insufficient to account for symptoms.   Flexible scope #2 was used. Right nasal passage with normal inferior, middle and superior turbinates. Nasal passage patent with clear middle meatus and widely patent max antrostomy with quiescent mucosa. Ethmoids clear without edema or polyps. Clear sphenoethmoid recess. Left nasal passage with normal inferior, middle and superior turbinates. Nasal passage patent with clear middle meatus and widely patent max antrostomy with quiescent mucosa. Ethmoids clear without edema or polyps. Clear sphenoidotomy.  Nasopharynx clear

## 2025-03-07 ENCOUNTER — APPOINTMENT (OUTPATIENT)
Dept: OTOLARYNGOLOGY | Facility: CLINIC | Age: 75
End: 2025-03-07
Payer: MEDICARE

## 2025-03-07 VITALS
WEIGHT: 195 LBS | HEART RATE: 76 BPM | TEMPERATURE: 97.5 F | HEIGHT: 74 IN | DIASTOLIC BLOOD PRESSURE: 60 MMHG | BODY MASS INDEX: 25.03 KG/M2 | SYSTOLIC BLOOD PRESSURE: 125 MMHG

## 2025-03-07 DIAGNOSIS — J01.00 ACUTE MAXILLARY SINUSITIS, UNSPECIFIED: ICD-10-CM

## 2025-03-07 PROCEDURE — 99214 OFFICE O/P EST MOD 30 MIN: CPT | Mod: 25

## 2025-03-07 PROCEDURE — 31231 NASAL ENDOSCOPY DX: CPT

## 2025-03-07 RX ORDER — MUPIROCIN 20 MG/G
2 OINTMENT TOPICAL
Qty: 1 | Refills: 3 | Status: ACTIVE | COMMUNITY
Start: 2025-03-07 | End: 1900-01-01

## 2025-04-04 ENCOUNTER — APPOINTMENT (OUTPATIENT)
Dept: OTOLARYNGOLOGY | Facility: CLINIC | Age: 75
End: 2025-04-04
Payer: MEDICARE

## 2025-04-04 VITALS
WEIGHT: 195 LBS | DIASTOLIC BLOOD PRESSURE: 69 MMHG | TEMPERATURE: 97.3 F | BODY MASS INDEX: 25.03 KG/M2 | HEIGHT: 74 IN | HEART RATE: 71 BPM | SYSTOLIC BLOOD PRESSURE: 120 MMHG

## 2025-04-04 DIAGNOSIS — R09.81 NASAL CONGESTION: ICD-10-CM

## 2025-04-04 DIAGNOSIS — J33.9 NASAL POLYP, UNSPECIFIED: ICD-10-CM

## 2025-04-04 PROCEDURE — 99213 OFFICE O/P EST LOW 20 MIN: CPT | Mod: 25

## 2025-04-04 PROCEDURE — 31231 NASAL ENDOSCOPY DX: CPT

## 2025-07-14 ENCOUNTER — NON-APPOINTMENT (OUTPATIENT)
Age: 75
End: 2025-07-14

## 2025-07-16 ENCOUNTER — APPOINTMENT (OUTPATIENT)
Dept: OTOLARYNGOLOGY | Facility: CLINIC | Age: 75
End: 2025-07-16
Payer: MEDICARE

## 2025-07-16 VITALS
SYSTOLIC BLOOD PRESSURE: 108 MMHG | HEART RATE: 77 BPM | BODY MASS INDEX: 25.03 KG/M2 | HEIGHT: 74 IN | WEIGHT: 195 LBS | DIASTOLIC BLOOD PRESSURE: 60 MMHG

## 2025-07-16 PROCEDURE — 99213 OFFICE O/P EST LOW 20 MIN: CPT | Mod: 25

## 2025-07-16 PROCEDURE — 31231 NASAL ENDOSCOPY DX: CPT
